# Patient Record
Sex: MALE | Race: WHITE | Employment: FULL TIME | URBAN - METROPOLITAN AREA
[De-identification: names, ages, dates, MRNs, and addresses within clinical notes are randomized per-mention and may not be internally consistent; named-entity substitution may affect disease eponyms.]

---

## 2018-09-18 ENCOUNTER — HOSPITAL ENCOUNTER (OUTPATIENT)
Dept: MRI IMAGING | Age: 23
Discharge: HOME | End: 2018-09-18
Attending: PODIATRIST
Payer: COMMERCIAL

## 2018-09-18 DIAGNOSIS — T14.8XXA FRACTURE: ICD-10-CM

## 2018-09-18 PROCEDURE — 73720 MRI LWR EXTREMITY W/O&W/DYE: CPT

## 2018-09-18 PROCEDURE — 74011250636 HC RX REV CODE- 250/636: Performed by: PODIATRIST

## 2018-09-18 PROCEDURE — A9575 INJ GADOTERATE MEGLUMI 0.1ML: HCPCS | Performed by: PODIATRIST

## 2018-09-18 RX ORDER — GADOTERATE MEGLUMINE 376.9 MG/ML
15 INJECTION INTRAVENOUS
Status: COMPLETED | OUTPATIENT
Start: 2018-09-18 | End: 2018-09-18

## 2018-09-18 RX ADMIN — GADOTERATE MEGLUMINE 15 ML: 376.9 INJECTION INTRAVENOUS at 15:19

## 2018-11-07 ENCOUNTER — OFFICE VISIT (OUTPATIENT)
Dept: INTERNAL MEDICINE CLINIC | Facility: CLINIC | Age: 23
End: 2018-11-07

## 2018-11-07 VITALS
HEART RATE: 73 BPM | DIASTOLIC BLOOD PRESSURE: 72 MMHG | RESPIRATION RATE: 18 BRPM | BODY MASS INDEX: 21.53 KG/M2 | TEMPERATURE: 98 F | HEIGHT: 70 IN | WEIGHT: 150.4 LBS | SYSTOLIC BLOOD PRESSURE: 114 MMHG

## 2018-11-07 DIAGNOSIS — Z23 ENCOUNTER FOR IMMUNIZATION: Primary | ICD-10-CM

## 2018-11-07 DIAGNOSIS — J45.20 MILD INTERMITTENT ASTHMA, UNSPECIFIED WHETHER COMPLICATED: ICD-10-CM

## 2018-11-07 DIAGNOSIS — K21.00 GASTROESOPHAGEAL REFLUX DISEASE WITH ESOPHAGITIS: ICD-10-CM

## 2018-11-07 RX ORDER — ALBUTEROL SULFATE 90 UG/1
AEROSOL, METERED RESPIRATORY (INHALATION)
COMMUNITY
End: 2018-11-07 | Stop reason: SDUPTHER

## 2018-11-07 RX ORDER — LAMOTRIGINE 200 MG/1
50 TABLET ORAL DAILY
COMMUNITY

## 2018-11-07 RX ORDER — BUSPIRONE HYDROCHLORIDE 15 MG/1
15 TABLET ORAL
COMMUNITY
End: 2021-12-13 | Stop reason: ALTCHOICE

## 2018-11-07 RX ORDER — FLUOXETINE HYDROCHLORIDE 20 MG/1
CAPSULE ORAL DAILY
COMMUNITY
End: 2019-03-05 | Stop reason: ALTCHOICE

## 2018-11-07 RX ORDER — GABAPENTIN 400 MG/1
400 CAPSULE ORAL
COMMUNITY
End: 2019-03-05

## 2018-11-07 RX ORDER — ALBUTEROL SULFATE 90 UG/1
2 AEROSOL, METERED RESPIRATORY (INHALATION)
Qty: 1 INHALER | Refills: 5 | Status: SHIPPED | OUTPATIENT
Start: 2018-11-07 | End: 2021-04-30 | Stop reason: SDUPTHER

## 2018-11-07 RX ORDER — PHENOL/SODIUM PHENOLATE
AEROSOL, SPRAY (ML) MUCOUS MEMBRANE
Qty: 30 TAB | Refills: 2 | Status: SHIPPED | OUTPATIENT
Start: 2018-11-07 | End: 2019-03-23 | Stop reason: SDUPTHER

## 2018-11-07 NOTE — PATIENT INSTRUCTIONS

## 2018-11-07 NOTE — PROGRESS NOTES
Subjective:      Clay Prajapati is a 21 y.o. male who is a new patient and is here to establish care and discuss reflux. He is from [facility]. The following sections were reviewed & updated as appropriate: PMH, PL, PSH, FH, RxH, and SH. GI Review  He has a history of GERD since he was 15, he has had symptoms the last 2 weeks and states nothing helping. He has tried tums, pepto. He is trying to avoid acidic foods/greasey foods, this was minimally helpful. Apply cider vinegar helped. He has had 2 endoscopies in the past and they found esophagitis. Current symptoms include: he notes throat tightness without the sensation of dysphagia. There are no active problems to display for this patient. Current Outpatient Medications   Medication Sig Dispense Refill    lamoTRIgine (LAMICTAL) 200 mg tablet Take  by mouth daily.  FLUoxetine (PROZAC) 20 mg capsule Take  by mouth daily.  gabapentin (NEURONTIN) 400 mg capsule Take 400 mg by mouth nightly.  busPIRone (BUSPAR) 15 mg tablet Take 15 mg by mouth three (3) times daily.  albuterol (PROVENTIL HFA, VENTOLIN HFA, PROAIR HFA) 90 mcg/actuation inhaler Take 2 Puffs by inhalation every six (6) hours as needed for Wheezing. 1 Inhaler 5    Omeprazole delayed release (PRILOSEC D/R) 20 mg tablet Use daily for 3-5 days per flare 30 Tab 2     No Known Allergies  Past Medical History:   Diagnosis Date    Asthma     Borderline personality disorder ([facility] [address]     Concussion     Migraines      Past Surgical History:   Procedure Laterality Date    HX ORTHOPAEDIC      right shoulder and both hips        Review of Systems    A comprehensive review of systems was negative except for that written in the HPI.      Objective:     Visit Vitals  /72   Pulse 73   Temp 98 °F (36.7 °C) (Oral)   Resp 18   Ht 5' 10\" (1.778 m)   Wt 150 lb 6.4 oz (68.2 kg)   BMI 21.58 kg/m²     General appearance: alert, cooperative, no distress, appears stated age  Head: Normocephalic, without obvious abnormality, atraumatic  Eyes: negative  Throat: Lips, mucosa, and tongue normal. Teeth and gums normal  Lungs: clear to auscultation bilaterally  Chest wall: no tenderness  Heart: regular rate and rhythm, S1, S2 normal, no murmur, click, rub or gallop  Extremities: extremities normal, atraumatic, no cyanosis or edema  Neurologic: Grossly normal  Nursing note and vitals reviewed  Assessment/Plan:       ICD-10-CM ICD-9-CM    1. Encounter for immunization Z23 V03.89 INFLUENZA VIRUS VAC QUAD,SPLIT,PRESV FREE SYRINGE IM   2. Mild intermittent asthma, unspecified whether complicated T07.94 519.12 albuterol (PROVENTIL HFA, VENTOLIN HFA, PROAIR HFA) 90 mcg/actuation inhaler   3. Gastroesophageal reflux disease with esophagitis K21.0 530.11 Omeprazole delayed release (PRILOSEC D/R) 20 mg tablet       Follow-up Disposition:  Return if symptoms worsen or fail to improve. Advised him to call back or return to office if symptoms worsen/change/persist.  Discussed expected course/resolution/complications of diagnosis in detail with patient. Medication risks/benefits/costs/interactions/alternatives discussed with patient. He was given an after visit summary which includes diagnoses, current medications, & vitals. He expressed understanding with the diagnosis and plan.

## 2018-11-07 NOTE — PROGRESS NOTES
Chief Complaint   Patient presents with    Heartburn     acid reflux for the last 2 weeks nothing helping. Tried to avoid acidic foods/greasey foods. 1. Have you been to the ER, urgent care clinic since your last visit? Hospitalized since your last visit? No    2. Have you seen or consulted any other health care providers outside of the [address] [place] [address] since your last visit? Include any pap smears or colon screening. Lissy Campos  is a 21 y.o.  male  who present for influenza immunizations/injections. He/she denies any symptoms , reactions or allergies that would exclude them from being immunized today. Risks and adverse reactions were discussed and the VIS was given if applicable to them. All questions were addressed. He/She was observed for 5 min post injection. There were no reactions observed.     Susan Farr LPN

## 2018-12-31 ENCOUNTER — OFFICE VISIT (OUTPATIENT)
Dept: INTERNAL MEDICINE CLINIC | Facility: CLINIC | Age: 23
End: 2018-12-31

## 2018-12-31 VITALS
BODY MASS INDEX: 21.62 KG/M2 | HEART RATE: 75 BPM | WEIGHT: 151 LBS | SYSTOLIC BLOOD PRESSURE: 111 MMHG | RESPIRATION RATE: 16 BRPM | TEMPERATURE: 97.8 F | DIASTOLIC BLOOD PRESSURE: 71 MMHG | HEIGHT: 70 IN

## 2018-12-31 DIAGNOSIS — M25.511 ACUTE PAIN OF RIGHT SHOULDER: Primary | ICD-10-CM

## 2018-12-31 RX ORDER — DICLOFENAC POTASSIUM 50 MG/1
50 TABLET, FILM COATED ORAL 3 TIMES DAILY
COMMUNITY
End: 2019-03-05

## 2018-12-31 RX ORDER — CYCLOBENZAPRINE HCL 10 MG
TABLET ORAL
COMMUNITY
End: 2019-02-06 | Stop reason: SDUPTHER

## 2018-12-31 NOTE — PROGRESS NOTES
Chief Complaint   Patient presents with    Shoulder Pain     right shoulder pain for the last 3 weeks has gotten progressivly worse. Same shoulder he previosly had surgery on.      1. Have you been to the ER, urgent care clinic since your last visit? Hospitalized since your last visit? Yes When: 122/29/18 Where: Urgent Care Reason for visit: Right shoulder pain    2. Have you seen or consulted any other health care providers outside of the [address] [place] [address] since your last visit? Include any pap smears or colon screening.  No

## 2018-12-31 NOTE — PROGRESS NOTES
Subjective:      Kristin Corona is a 21 y.o. male who presents today for shoulder pain. Shoulder pain:right shoulder pain for the last 3 weeks, has gotten progressivly worse. Same shoulder he previosly had surgery on.  symptoms include dull and achy constantly with intermittent sharp pain. He was seen at urgent care and started on diclofenac and flexeril. Symptoms started with throwing snowballs. He denies numbness and tingling. There are no active problems to display for this patient. Current Outpatient Medications   Medication Sig Dispense Refill    cyclobenzaprine (FLEXERIL) 10 mg tablet Take  by mouth three (3) times daily as needed for Muscle Spasm(s).  diclofenac potassium (CATAFLAM) 50 mg tablet Take 50 mg by mouth three (3) times daily.  lamoTRIgine (LAMICTAL) 200 mg tablet Take  by mouth daily.  FLUoxetine (PROZAC) 20 mg capsule Take  by mouth daily.  gabapentin (NEURONTIN) 400 mg capsule Take 400 mg by mouth nightly.  busPIRone (BUSPAR) 15 mg tablet Take 15 mg by mouth three (3) times daily.  albuterol (PROVENTIL HFA, VENTOLIN HFA, PROAIR HFA) 90 mcg/actuation inhaler Take 2 Puffs by inhalation every six (6) hours as needed for Wheezing. 1 Inhaler 5    Omeprazole delayed release (PRILOSEC D/R) 20 mg tablet Use daily for 3-5 days per flare 30 Tab 2     No Known Allergies  Past Medical History:   Diagnosis Date    Asthma     Borderline [facility] disorder ([facility] [address]     Concussion     Migraines      Past Surgical History:   Procedure Laterality Date    HX ORTHOPAEDIC      right shoulder and both hips        Review of Systems    A comprehensive review of systems was negative except for that written in the HPI.      Objective:     Visit Vitals  /71   Pulse 75   Temp 97.8 °F (36.6 °C) (Oral)   Resp 16   Ht 5' 10\" (1.778 m)   Wt 151 lb (68.5 kg)   BMI 21.67 kg/m²     General appearance: alert, cooperative, no distress, appears stated age  Head: Normocephalic, without obvious abnormality, atraumatic  Eyes: negative  Lungs: clear to auscultation bilaterally  Heart: regular rate and rhythm, S1, S2 normal, no murmur, click, rub or gallop  Extremities: extremities normal, atraumatic, no cyanosis or edema. Pain with right shoulder rotation. Asymmetrical shoulders  Pulses: 2+ and symmetric  Neurologic: Grossly normal  Nursing note and vitals reviewed  Assessment/Plan:       ICD-10-CM ICD-9-CM    1. Acute pain of right shoulder M25.511 719.41 REFERRAL TO SPORTS MEDICINE      REFERRAL TO ORTHOPEDIC SURGERY       Follow-up Disposition:  Return if symptoms worsen or fail to improve. Advised him to call back or return to office if symptoms worsen/change/persist.  Discussed expected course/resolution/complications of diagnosis in detail with patient. Medication risks/benefits/costs/interactions/alternatives discussed with patient. He was given an after visit summary which includes diagnoses, current medications, & vitals. He expressed understanding with the diagnosis and plan.

## 2019-01-03 ENCOUNTER — OFFICE VISIT (OUTPATIENT)
Dept: INTERNAL MEDICINE CLINIC | Age: 24
End: 2019-01-03

## 2019-01-03 VITALS
OXYGEN SATURATION: 99 % | HEIGHT: 70 IN | SYSTOLIC BLOOD PRESSURE: 116 MMHG | RESPIRATION RATE: 16 BRPM | TEMPERATURE: 98 F | HEART RATE: 63 BPM | BODY MASS INDEX: 21.43 KG/M2 | WEIGHT: 149.7 LBS | DIASTOLIC BLOOD PRESSURE: 74 MMHG

## 2019-01-03 DIAGNOSIS — M25.511 ACUTE PAIN OF RIGHT SHOULDER: ICD-10-CM

## 2019-01-03 DIAGNOSIS — S43.431A TEAR OF RIGHT GLENOID LABRUM, INITIAL ENCOUNTER: Primary | ICD-10-CM

## 2019-01-03 RX ORDER — METHYLPREDNISOLONE 4 MG/1
TABLET ORAL
Qty: 1 DOSE PACK | Refills: 0 | Status: SHIPPED | OUTPATIENT
Start: 2019-01-03 | End: 2019-03-05 | Stop reason: ALTCHOICE

## 2019-01-03 NOTE — PROGRESS NOTES
Chief Complaint   Patient presents with    Shoulder Pain     he is a 21y.o. year old male who presents for evaluation of right shoulder  Pain Assessment Encounter      Arabella Rodriguez  1/3/2019  Onset of Symptoms: a couple weeks  ________________________________________________________________________  Description: Patient has a hx of torn labrum and had it repaired. Patient was throwing snowballs and noticed his shoulder was painful. Frequency: more than 5 times a day  Pain Scale:(1-10): 5  Trauma Hx: throwing snow balls  Hx of similar symptoms: Yes  Radiation: YES, arm  Duration:  continuous      Progression: has worsened  What makes it better?: ice, OTC meds and rest  What makes it worse?:use  Medications tried: acetaminophen, ibuprofen    Reviewed and agree with Nurse Note and duplicated in this note. Reviewed PmHx, RxHx, FmHx, SocHx, AllgHx and updated and dated in the chart.     Family History   Problem Relation Age of Onset    Asthma Mother     Asthma Father        Past Medical History:   Diagnosis Date    Asthma     Borderline personality disorder ([facility] [address]     Concussion     Migraines       Social History     Socioeconomic History    Marital status: SINGLE     Spouse name: Not on file    Number of children: Not on file    Years of education: Not on file    Highest education level: Not on file   Tobacco Use    Smoking status: Never Smoker    Smokeless tobacco: Never Used   Substance and Sexual Activity    Alcohol use: No     Frequency: Never        Review of Systems - negative except as listed above      Objective:     Vitals:    01/03/19 1430   BP: 116/74   Pulse: 63   Resp: 16   Temp: 98 °F (36.7 °C)   TempSrc: Oral   SpO2: 99%   Weight: 149 lb 11.2 oz (67.9 kg)   Height: 5' 10\" (1.778 m)       Physical Examination: General appearance - alert, well appearing, and in no distress  Back exam - full range of motion, no tenderness, palpable spasm or pain on motion  Neurological - alert, oriented, normal speech, no focal findings or movement disorder noted  Musculoskeletal - The right shoulder is  normal to inspection. The patient has diminished range with pain  . The shoulderis tender to palpation . Bicep tendon: non-tender  The NEER test is positive. The Gilmore test:is positive   The Cross over test:is  negative. The Empty Can test:is  positive. Stressed ext rotation is:  negative. Stressed int rotation: negative. The Apprehension Sign is negative. The Lift off test is:  negative   Examination reveals the Painful Arch:  positive. The Labral Test is:  Positive. The Sulcus Sign is:  negative. Extremities - peripheral pulses normal, no pedal edema, no clubbing or cyanosis  Skin - normal coloration and turgor, no rashes, no suspicious skin lesions noted    Assessment/ Plan:   Diagnoses and all orders for this visit:    1. Tear of right glenoid labrum, initial encounter  -     XR SHOULDER RT AP/LAT MIN 2 V; Future    Other orders  -     methylPREDNISolone (MEDROL DOSEPACK) 4 mg tablet; Take as directed    Physical findings of labral tear, will attempt conservative management with prednisone and physical therapy. If no resolution will get MRI    Pathophysiology, recovery and rehabilitation process discussed and questions answered   Counseling for 30 Minutes of the total visit duration   Pictures and figures used as necessary   Provided reassurance   Monitor response to injection   Discussed steroid side effects of fat atrophy, hypopigmentation, steroid flare or infection   Monitor response to Physical Therapy   Recommend activity modification   Recommend  lower impact activities-walking, Eliptical, Nordic Track, cycling or swimming      Patient was informed/counseled on: Body mass index is 21.48 kg/m². 1) Remember to stay active and/or exercise regularly (I suggest 30-45 minutes daily)   2) For reliable dietary information, go to www. EATRIGHT.org. You may wish to consider seeing the nutritionist at [facility] [phone], also consider the [address] I have discussed the diagnosis with the patient and the intended plan as seen in the above orders. The patient has received an after-visit summary and questions were answered concerning future plans. Medication Side Effects and Warnings were discussed with patient,  Patient Labs were reviewed and or requested, and  Patient Past Records were reviewed and or requested  yes      Pt agrees to call or return to clinic and/or go to closest ER with any worsening of symptoms. This may include, but not limited to increased fever (>100.4) with NSAIDS or Tylenol, increased edema, confusion, rash, worsening of presenting symptoms.

## 2019-01-14 ENCOUNTER — HOSPITAL ENCOUNTER (OUTPATIENT)
Dept: PHYSICAL THERAPY | Age: 24
Discharge: HOME | End: 2019-01-14
Payer: COMMERCIAL

## 2019-01-14 PROCEDURE — 97161 PT EVAL LOW COMPLEX 20 MIN: CPT

## 2019-01-14 PROCEDURE — 97016 VASOPNEUMATIC DEVICE THERAPY: CPT

## 2019-01-14 PROCEDURE — 97110 THERAPEUTIC EXERCISES: CPT

## 2019-01-14 NOTE — PROGRESS NOTES
[facility]  [address] [address], [address] [place] [address]  Phone: [phone]  Fax: [phone]    Plan of Care/Statement of Necessity for Physical Therapy Services  2-15    Patient name: Gerhardt Slimmer  : 1995  Provider#: [id number]  Referral source: Baldev Tatum MD      Medical/Treatment Diagnosis: Tear of right acetabular labrum [S73.191A]     Prior Hospitalization: see medical history     Comorbidities: right SLAP repair   Prior Level of Function: pt works full-time as a cook/baker  Medications: Verified on Patient Summary List    Start of Care: 2019      Onset Date: 2018       The Plan of Care and following information is based on the information from the initial evaluation. Assessment/ key information: Pt is a 21year old male who is referred to Physical Therapy by Dr. Adilia Traylor with a diagnosis of tear of right glenoid labrum. Pt has history of right SLAP repair in 2013. Pain started in 2018, after throwing snowballs. Strength in right UE is intact. Right shoulder A/PROM is limited and painful. Patient will benefit from skilled PT services to modify and progress therapeutic interventions, address functional mobility deficits, address ROM deficits, address strength deficits, analyze and address soft tissue restrictions, analyze and cue movement patterns, analyze and modify body mechanics/ergonomics and assess and modify postural abnormalities to attain pt/PT goals. Evaluation Complexity History MEDIUM  Complexity : 1-2 comorbidities / personal factors will impact the outcome/ POC ; Examination HIGH Complexity : 4+ Standardized tests and measures addressing body structure, function, activity limitation and / or participation in recreation  ;Presentation LOW Complexity : Stable, uncomplicated  ;Clinical Decision Making MEDIUM Complexity;  Overall Complexity Rating: LOW     Problem List: pain affecting function, decrease ROM, decrease strength, decrease ADL/ functional abilitiies, decrease activity tolerance, decrease flexibility/ joint mobility and decrease transfer abilities   Treatment Plan may include any combination of the following: Therapeutic exercise, Therapeutic activities, Neuromuscular re-education, Physical agent/modality, Manual therapy and Patient education  Patient / Family readiness to learn indicated by: asking questions, trying to perform skills and interest  Persons(s) to be included in education: patient (P)  Barriers to Learning/Limitations: None  Patient Goal (s): Pain reduction, increased strength and flexibility.   Patient Self Reported Health Status: fair  Rehabilitation Potential: good    Short Term Goals: To be accomplished in 2 weeks:  1)  Pt will be Independent with HEP. 2) Pt will be able to reach above shoulder level without increase of pain. 3) Pt will be able to wash face without pain. Long Term Goals: To be accomplished in 4 weeks:  1) Pt will be able to tuck in the shirt without increase of pain. 2) Pt will be able to reach above shoulder level without increase of pain. 3) Pt will be able to lift >/= 25 pounds without pain. 4) Pt will be able to sleep on involved side without waking due to pain. Frequency / Duration: Patient to be seen 2 times per week for 4 weeks. Patient/ Caregiver education and instruction: self care, activity modification and exercises     [x]  Plan of care has been reviewed with WHITNEY Krause PT, DPT   1/14/2019 10:51 AM    ________________________________________________________________________    I certify that the above Therapy Services are being furnished while the patient is under my care. I agree with the treatment plan and certify that this therapy is necessary.     [de-identified] Signature:____________________  Date:____________Time: _________

## 2019-01-14 NOTE — PROGRESS NOTES
PT INITIAL EVALUATION NOTE - [facility] 2-15    Patient Name: Adell Sandhoff  Date:2019  : 1995  [x]  Patient  Verified  Payor: Irene Benavides / Plan: Gela Greer Se HMO / Product Type: HMO /    In time:9:45am  Out time:10:40am  Total Treatment Time (min): 55  Total Timed Codes (min): 10  1:1 Treatment Time ( [address] only): --   Visit #: 1     Treatment Area: Tear of right acetabular labrum [S73.191A]    SUBJECTIVE  Pain Level (0-10 scale): 5/10  Any medication changes, allergies to medications, adverse drug reactions, diagnosis change, or new procedure performed?: [] No    [x] Yes (see summary sheet for update)  Subjective:    Pt reports pain in right shoulder that started about 1 month ago (2018) after throwing snowballs. No pain initially- woke up with pain in right anterior shoulder the next day. Pt has history of right SLAP tear- surgically repaired in  and is worried that he re-tore his labrum. Pt is not able to sleep on right side. He has modified activities at work due to pain (pt works as a iXpert and baker). Pt denies radicular symptoms in right UE. PLOF: no right shoulder pain or functional limitation  Mechanism of Injury: overuse/throwing  Previous Treatment/Compliance: NSAID; however, pt has tried to use them minimally due to stomach issues/IBS; prednisone dose pack, ice, rest, activity modification  PMHx/Surgical Hx: right SLAP repair 2013  Work Hx: pt works full-time as a iXpert/baker  Living Situation: pt lives alone  Pt Goals: \"Pain reduction, increased strength and flexibility. \"  Barriers: history of previous SLAP tear  Motivation: high  Substance use: none  FABQ Score: not captured     OBJECTIVE/EXAMINATION  Posture:  Slouched posture with rounded shoulders and forward head  Other Observations:  Increased turgor in right UT   Functional  and Pinch:  unremarkable  Palpation: tenderness to palpation right subacromial space, right UT, pecs    Right Shoulder ROM: AROM     PROM   Flexion   130 degrees, pain   160 degrees   Abduction  90 degrees, pain   120 degrees   IR   Hand to T1, pain   45 degrees   ER   Hand to T11, pain   50 degrees    Joint Mobility Assessment: Glenohumeral: muscle guarding, pain        Flexibility: tightness right pecs, UTs, biceps    UPPER QUARTER   MUSCLE STRENGTH  KEY       R  L  0 - No Contraction   Flexion  4/5, pain --  1 - Trace    Extension 5/5  --  2 - Poor    Abduction 4+/5, pain --  3 - Fair     IR  5/5, pain --  4 - Good    ER  4+/5, pain --  5 - Normal       Neurological: Reflexes / Sensations: normal  Special Tests: Briseida Cove: negative      Scapular Reposition: positive  Shoulder Abduction: positive     Crank: positive    Rice: positive    Relocation : positive   Speed's: positive    Yergason: positive    Modality rationale: decrease inflammation and decrease pain to improve the patients ability to use right UE to perform functional activities and work duties   Type Additional Details   [] Estim: []Att   []Unatt        []TENS instruct                  []IFC  []Premod   []NMES                     []Other:  []w/US   []w/ice   []w/heat  Position:  Location:   []  Traction: [] Cervical       []Lumbar                       [] Prone          []Supine                       []Intermittent   []Continuous Lbs:  [] before manual  [] after manual  []w/heat   []  Ultrasound: []Continuous   [] Pulsed at:                           []1MHz   []3MHz Location:  W/cm2:   [] Paraffin         Location:   []w/heat   []  Ice     []  Heat  []  Ice massage Position:  Location:   []  Laser  []  Other: Position:  Location:     [x]  Vasopneumatic Device Pressure:       [] lo [x] med [] hi   Temperature: 34 degrees  Location: right shoulder with pt supine at end of session     [x] Skin assessment post-treatment:  [x]intact []redness- no adverse reaction    []redness - adverse reaction:     10 min Therapeutic Exercise:  [] See flow sheet :   Rationale: increase ROM and increase strength to improve the patients ability to use right UE for functional and recreational activities          With   [] TE   [] TA   [] neuro   [] other: Patient Education: [x] Review HEP- pt given handout with exercises for HEP    [] Progressed/Changed HEP based on:   [] positioning   [] body mechanics   [] transfers   [] heat/ice application    [] other:      Other Objective/Functional Measures: FOTO: not captured    Pain Level (0-10 scale) post treatment: 1/10    ASSESSMENT/Changes in Function:   Pt is a 21year old male who is referred to Physical Therapy by Dr. Hari Ray with a diagnosis of tear of right glenoid labrum. Pt has history of right SLAP repair in April 2013. Pain started in December 2018, after throwing snowballs. Strength in right UE is intact. Right shoulder A/PROM is limited and painful. Patient will benefit from skilled PT services to modify and progress therapeutic interventions, address functional mobility deficits, address ROM deficits, address strength deficits, analyze and address soft tissue restrictions, analyze and cue movement patterns, analyze and modify body mechanics/ergonomics and assess and modify postural abnormalities to attain pt/PT goals.      [x]  See Plan of [address] PT, DPT  1/14/2019  10:49 AM

## 2019-01-17 ENCOUNTER — HOSPITAL ENCOUNTER (OUTPATIENT)
Dept: PHYSICAL THERAPY | Age: 24
Discharge: HOME | End: 2019-01-17
Payer: COMMERCIAL

## 2019-01-17 PROCEDURE — 97140 MANUAL THERAPY 1/> REGIONS: CPT

## 2019-01-17 PROCEDURE — 97016 VASOPNEUMATIC DEVICE THERAPY: CPT

## 2019-01-17 PROCEDURE — 97110 THERAPEUTIC EXERCISES: CPT

## 2019-01-17 NOTE — PROGRESS NOTES
PT DAILY TREATMENT NOTE - [facility] 2-15    Patient Name: Odalis Iglesias  Date:2019  : 1995  [x]  Patient  Verified  Payor: [place] Fajardo / Plan: [address] HMO / Product Type: HMO /    In time: 9:05am  Out time:10:15am  Total Treatment Time (min): 70  Total Timed Codes (min): 55  1:1 Treatment Time ( [address] only): --   Visit #: 2     Treatment Area: Right shoulder pain [M25.511]    SUBJECTIVE  Pain Level (0-10 scale): 4/10  Any medication changes, allergies to medications, adverse drug reactions, diagnosis change, or new procedure performed?: [x] No    [] Yes (see summary sheet for update)  Subjective functional status/changes:   [] No changes reported  Pt reports compliance with HEP. Pt states that right shoulder feel \"sore\" after performing exercises at home and after work- doing a lot of lifting and stirring.       OBJECTIVE  Modality rationale: decrease inflammation and decrease pain to improve the patients ability to use right UE to perform functional activities and work duties   Min Type Additional Details      [] Estim: []Att   []Unatt    []TENS instruct                  []IFC  []Premod   []NMES                     []Other:  []w/US   []w/ice   []w/heat  Position:  Location:      []  Traction: [] Cervical       []Lumbar                       [] Prone          []Supine                       []Intermittent   []Continuous Lbs:  [] before manual  [] after manual  []w/heat    []  Ultrasound: []Continuous   [] Pulsed                       at: []1MHz   []3MHz Location:  W/cm2:    [] Paraffin         Location:   []w/heat    []  Ice     []  Heat  []  Ice massage Position:  Location:    []  Laser  []  Other: Position:  Location:   15   [x]  Vasopneumatic Device Pressure:       [] lo [x] med [] hi   Temperature: 34 degree   [x] Skin assessment post-treatment:  [x]intact []redness- no adverse reaction    []redness - adverse reaction:     45 min Therapeutic Exercise:  [x] See flow sheet :   Rationale: increase ROM, increase strength and improve coordination to improve the patients ability to use right UE to perform functional activities with increased ease    10 min Manual Therapy: right scapular mobilization inferior glide and distraction, grade II and III; PROM right shoulder flexion, abduction, ER to pt's tolerance; right scapula PNF D2 protraction/retraction with light manual resistance   Rationale: decrease pain, increase ROM, increase tissue extensibility and increase postural awareness to improve the patients ability to use right UE to perform functional activities and work duties    With   [] TE   [] TA   [] neuro   [] other: Patient Education: [x] Review HEP    [] Progressed/Changed HEP based on:   [] positioning   [] body mechanics   [] transfers   [] heat/ice application    [] other:      Other Objective/Functional Measures: --     Pain Level (0-10 scale) post treatment: 0/10    ASSESSMENT/Changes in Function:   Pt complained of \"pinching\" pain with wall slide flexion (with ball); therefore, modified flexion stretch to table walk-out, which pt was pain-free. Pt fatigued quickly with strengthening exercises. Patient will continue to benefit from skilled PT services to modify and progress therapeutic interventions, address functional mobility deficits, address ROM deficits, address strength deficits, analyze and address soft tissue restrictions, analyze and cue movement patterns, analyze and modify body mechanics/ergonomics and assess and modify postural abnormalities to attain remaining goals. []  See Plan of Care  []  See progress note/recertification  []  See Discharge Summary         Progress towards goals / Updated goals:  Short Term Goals: To be accomplished in 2 weeks:  1)  Pt will be Independent with HEP. 2) Pt will be able to reach above shoulder level without increase of pain. 3) Pt will be able to wash face without pain.     Long Term Goals:  To be accomplished in 4 weeks:  1) Pt will be able to tuck in the shirt without increase of pain. 2) Pt will be able to reach above shoulder level without increase of pain. 3) Pt will be able to lift >/= 25 pounds without pain. 4) Pt will be able to sleep on involved side without waking due to pain.                        PLAN  [x]  Upgrade activities as tolerated     [x]  Continue plan of care  []  Update interventions per flow sheet       []  Discharge due to:_  []  Other:_      Perico Graham PT, DPT   1/17/2019  9:22 AM

## 2019-01-21 ENCOUNTER — HOSPITAL ENCOUNTER (OUTPATIENT)
Dept: PHYSICAL THERAPY | Age: 24
Discharge: HOME | End: 2019-01-21
Payer: COMMERCIAL

## 2019-01-21 PROCEDURE — 97110 THERAPEUTIC EXERCISES: CPT

## 2019-01-21 PROCEDURE — 97016 VASOPNEUMATIC DEVICE THERAPY: CPT

## 2019-01-21 PROCEDURE — 97140 MANUAL THERAPY 1/> REGIONS: CPT

## 2019-01-21 NOTE — PROGRESS NOTES
PT DAILY TREATMENT NOTE - [facility] 2-15    Patient Name: Bebeto Rodríguez  Date:2019  : 1995  [x]  Patient  Verified  Payor: Clarisa Chau / Plan: [address] HMO / Product Type: HMO /    In time: 3:40pm  Out time: 4:48pm  Total Treatment Time (min): 78  Total Timed Codes (min): 63  1:1 Treatment Time ( only): --   Visit #: 3    Treatment Area: Right shoulder pain [M25.511]    SUBJECTIVE  Pain Level (0-10 scale): 3/10  Any medication changes, allergies to medications, adverse drug reactions, diagnosis change, or new procedure performed?: [x] No    [] Yes (see summary sheet for update)  Subjective functional status/changes:   [] No changes reported  Pt states that he has not done much to aggravate right shoulder since last PT session. Pt complains that he is still having pain at night. Pt complains of \"clunking\" in right shoulder with rotational movements, which he did not have prior to the recent injury.     OBJECTIVE  Modality rationale: decrease inflammation and decrease pain to improve the patients ability to use right UE to perform functional activities and work duties   Min Type Additional Details      [] Estim: []Att   []Unatt    []TENS instruct                  []IFC  []Premod   []NMES                     []Other:  []w/US   []w/ice   []w/heat  Position:  Location:      []  Traction: [] Cervical       []Lumbar                       [] Prone          []Supine                       []Intermittent   []Continuous Lbs:  [] before manual  [] after manual  []w/heat    []  Ultrasound: []Continuous   [] Pulsed                       at: []1MHz   []3MHz Location:  W/cm2:    [] Paraffin         Location:   []w/heat    []  Ice     []  Heat  []  Ice massage Position:  Location:    []  Laser  []  Other: Position:  Location:   15   [x]  Vasopneumatic Device Pressure:       [] lo [x] med [] hi   Temperature: 34 degree   [x] Skin assessment post-treatment:  [x]intact []redness- no adverse reaction    []redness - adverse reaction:     53 min Therapeutic Exercise:  [x] See flow sheet :   Rationale: increase ROM, increase strength and improve coordination to improve the patients ability to use right UE to perform functional activities with increased ease    10 min Manual Therapy: PROM right shoulder flexion, abduction, ER to pt's tolerance; passive stretch to right biceps; right scapula PNF D2 protraction/retraction with light manual resistance; P/A glides to mid-upper thoracic spine, grade II and III   Rationale: decrease pain, increase ROM, increase tissue extensibility and increase postural awareness to improve the patients ability to use right UE to perform functional activities and work duties    With   [] TE   [] TA   [] neuro   [] other: Patient Education: [x] Review HEP    [] Progressed/Changed HEP based on:   [] positioning   [] body mechanics   [] transfers   [] heat/ice application    [] other:      Other Objective/Functional Measures: --     Pain Level (0-10 scale) post treatment: 0/10    ASSESSMENT/Changes in Function:   Pt reports apprehension with manual ER. Recommend pt continue outpatient PT but follow-up with Dr. Tania Sandhu concerning persistent night pain and clunking in right shoulder. Patient will continue to benefit from skilled PT services to modify and progress therapeutic interventions, address functional mobility deficits, address ROM deficits, address strength deficits, analyze and address soft tissue restrictions, analyze and cue movement patterns, analyze and modify body mechanics/ergonomics and assess and modify postural abnormalities to attain remaining goals. []  See Plan of Care  []  See progress note/recertification  []  See Discharge Summary         Progress towards goals / Updated goals:  Short Term Goals: To be accomplished in 2 weeks:  1)  Pt will be Independent with HEP. MET  2) Pt will be able to reach above shoulder level without increase of pain.   3) Pt will be able to wash face without pain.     Long Term Goals: To be accomplished in 4 weeks:  1) Pt will be able to tuck in the shirt without increase of pain. 2) Pt will be able to reach above shoulder level without increase of pain. 3) Pt will be able to lift >/= 25 pounds without pain. 4) Pt will be able to sleep on involved side without waking due to pain.                        PLAN  [x]  Upgrade activities as tolerated     [x]  Continue plan of care  []  Update interventions per flow sheet       []  Discharge due to:_  []  Other:_      Lidia Arora PT, DPT   1/21/2019  9:22 AM

## 2019-01-24 ENCOUNTER — APPOINTMENT (OUTPATIENT)
Dept: PHYSICAL THERAPY | Age: 24
End: 2019-01-24
Payer: COMMERCIAL

## 2019-01-28 ENCOUNTER — OFFICE VISIT (OUTPATIENT)
Dept: INTERNAL MEDICINE CLINIC | Age: 24
End: 2019-01-28

## 2019-01-28 VITALS
DIASTOLIC BLOOD PRESSURE: 71 MMHG | WEIGHT: 148 LBS | OXYGEN SATURATION: 97 % | BODY MASS INDEX: 21.19 KG/M2 | HEIGHT: 70 IN | HEART RATE: 69 BPM | SYSTOLIC BLOOD PRESSURE: 127 MMHG

## 2019-01-28 DIAGNOSIS — M25.511 ACUTE PAIN OF RIGHT SHOULDER: Primary | ICD-10-CM

## 2019-01-28 DIAGNOSIS — S43.431A TEAR OF RIGHT GLENOID LABRUM, INITIAL ENCOUNTER: ICD-10-CM

## 2019-01-28 NOTE — PROGRESS NOTES
Chief Complaint   Patient presents with    Shoulder Pain     follow up right     he is a 21y.o. year old male who presents for follow up of injury. Follow Up Pain Assessment Encounter      Onset of Symptoms: 2 months  ________________________________________________________________________  Description: Pain is now has significantly improved      Pain Scale:(1-10): 4 currently and 8/10 at its worse  Duration:  continuous  Radiation: none, sometimes pain radiates down right arm and neck tightness. What makes it better?: ice  What makes it worse?:abducting right arm  Medications tried: ibuprofen  Modalities tried: PT        Reviewed and agree with Nurse Note and duplicated in this note. Reviewed PmHx, RxHx, FmHx, SocHx, AllgHx and updated and dated in the chart. Family History   Problem Relation Age of Onset    Asthma Mother     Asthma Father        Past Medical History:   Diagnosis Date    Asthma     Borderline personality disorder ([facility] [address]     Concussion     Migraines       Social History     Socioeconomic History    Marital status: SINGLE     Spouse name: Not on file    Number of children: Not on file    Years of education: Not on file    Highest education level: Not on file   Tobacco Use    Smoking status: Never Smoker    Smokeless tobacco: Never Used   Substance and Sexual Activity    Alcohol use: No     Frequency: Never        Review of Systems - negative except as listed above      Objective:     Vitals:    01/28/19 1552   Weight: 67.1 kg (148 lb)   Height: 5' 10\" (1.778 m)       Physical Examination: General appearance - alert, well appearing, and in no distress  Back exam - full range of motion, no tenderness, palpable spasm or pain on motion  Neurological - alert, oriented, normal speech, no focal findings or movement disorder noted  Musculoskeletal - The right shoulder is  normal to inspection. The patient has diminished range with pain  . The shoulderis tender to palpation . Bicep tendon: non-tender  The NEER test is positive. The Gilmore test:is positive   The Cross over test:is  negative. The Empty Can test:is  positive. Stressed ext rotation is:  negative. Stressed int rotation: negative. The Apprehension Sign is negative. The Lift off test is:  negative   Examination reveals the Painful Arch:  positive. The Labral Test is:  Positive. The Sulcus Sign is:  negative. Extremities - peripheral pulses normal, no pedal edema, no clubbing or cyanosis  Skin - normal coloration and turgor, no rashes, no suspicious skin lesions noted      Assessment/ Plan:   Diagnoses and all orders for this visit:    1. Acute pain of right shoulder  -     XR ARTHRO SHOULDER RT; Future    2. Tear of right glenoid labrum, initial encounter  -     MRI SHOULDER RT W CONT; Future  -     XR ARTHRO SHOULDER RT; Future     concern for labral tear  Follow-up Disposition:  Return if symptoms worsen or fail to improve. Pathophysiology, recovery and rehabilitation process discussed and questions answered   Counseling for 30 Minutes of the total visit duration   Pictures and figures used as necessary   Provided reassurance   Recommend activity modification   Recommend  lower impact activities-walking, Eliptical, Nordic Track, cycling or swimming   Follow up in 4 week(s)    I have discussed the diagnosis with the patient and the intended plan as seen in the above orders. The patient has received an after-visit summary and questions were answered concerning future plans. Medication Side Effects and Warnings were discussed with patient,  Patient Labs were reviewed and or requested, and  Patient Past Records were reviewed and or requested  yes     Pt agrees to call or return to clinic and/or go to closest ER with any worsening of symptoms. This may include, but not limited to increased fever (>100.4) with NSAIDS or Tylenol, increased edema, confusion, rash, worsening of presenting symptoms.

## 2019-01-29 ENCOUNTER — HOSPITAL ENCOUNTER (OUTPATIENT)
Dept: PHYSICAL THERAPY | Age: 24
Discharge: HOME | End: 2019-01-29
Payer: COMMERCIAL

## 2019-01-29 PROCEDURE — 97110 THERAPEUTIC EXERCISES: CPT

## 2019-01-29 PROCEDURE — 97016 VASOPNEUMATIC DEVICE THERAPY: CPT

## 2019-01-29 NOTE — PROGRESS NOTES
PT DAILY TREATMENT NOTE - [facility] 2-15    Patient Name: Jim Hernandez  Date:2019  : 1995  [x]  Patient  Verified  Payor: Danielle Schwab / Plan: [address] HMO / Product Type: HMO /    In time: 9:05am  Out time: 10:15am  Total Treatment Time (min): 70  Total Timed Codes (min): 55  1:1 Treatment Time ( only): --   Visit #: 4    Treatment Area: Right shoulder pain [M25.511]    SUBJECTIVE  Pain Level (0-10 scale): 3/10  Any medication changes, allergies to medications, adverse drug reactions, diagnosis change, or new procedure performed?: [x] No    [] Yes (see summary sheet for update)  Subjective functional status/changes:   [] No changes reported  Pt had follow-up with Dr. Amina Alvarez and is scheduled for MRI of right shoulder.     OBJECTIVE  Modality rationale: decrease inflammation and decrease pain to improve the patients ability to use right UE to perform functional activities and work duties   Type Additional Details   [] Estim: []Att   []Unatt    []TENS instruct                  []IFC  []Premod   []NMES                     []Other:  []w/US   []w/ice   []w/heat  Position:  Location:   []  Traction: [] Cervical       []Lumbar                       [] Prone          []Supine                       []Intermittent   []Continuous Lbs:  [] before manual  [] after manual  []w/heat   []  Ultrasound: []Continuous   [] Pulsed                       at: []1MHz   []3MHz Location:  W/cm2:   [] Paraffin         Location:   []w/heat   []  Ice     []  Heat  []  Ice massage Position:  Location:   []  Laser  []  Other: Position:  Location:     [x]  Vasopneumatic Device Pressure:       [] lo [x] med [] hi   Temperature: 34 degree   [x] Skin assessment post-treatment:  [x]intact []redness- no adverse reaction    []redness - adverse reaction:     55 min Therapeutic Exercise:  [x] See flow sheet :   Rationale: increase ROM, increase strength and improve coordination to improve the patients ability to use right UE to perform functional activities with increased ease    With   [] TE   [] TA   [] neuro   [] other: Patient Education: [x] Review HEP- pt given updated handout with exercises for HEP  [] Progressed/Changed HEP based on:   [] positioning   [] body mechanics   [] transfers   [] heat/ice application    [] other:      Other Objective/Functional Measures: --     Pain Level (0-10 scale) post treatment: 0/10    ASSESSMENT/Changes in Function:   Pt given updated handout with exercises for HEP. Plan to continue x 1 more PT visit, then hold until MRI results (MRI with contrast scheduled for 02/14/2019). []  See Plan of Care  []  See progress note/recertification  []  See Discharge Summary         Progress towards goals / Updated goals:  Short Term Goals: To be accomplished in 2 weeks:  1)  Pt will be Independent with HEP. MET  2) Pt will be able to reach above shoulder level without increase of pain. progressing  3) Pt will be able to wash face without pain. MET     Long Term Goals: To be accomplished in 4 weeks:  1) Pt will be able to tuck in the shirt without increase of pain. progressing  2) Pt will be able to reach above shoulder level without increase of pain. progressing  3) Pt will be able to lift >/= 25 pounds without pain. progressing  4) Pt will be able to sleep on involved side without waking due to pain.   progressing    PLAN  [x]  Upgrade activities as tolerated     [x]  Continue plan of care  []  Update interventions per flow sheet       []  Discharge due to:_  []  Other:_      Montana Trimble PT, DPT   1/29/2019  9:22 AM

## 2019-01-31 ENCOUNTER — HOSPITAL ENCOUNTER (OUTPATIENT)
Dept: PHYSICAL THERAPY | Age: 24
Discharge: HOME | End: 2019-01-31
Payer: COMMERCIAL

## 2019-01-31 PROCEDURE — 97016 VASOPNEUMATIC DEVICE THERAPY: CPT

## 2019-01-31 PROCEDURE — 97110 THERAPEUTIC EXERCISES: CPT

## 2019-01-31 PROCEDURE — 97140 MANUAL THERAPY 1/> REGIONS: CPT

## 2019-01-31 NOTE — PROGRESS NOTES
PT DAILY TREATMENT NOTE - [facility] 2-15    Patient Name: Bebeto Rodríguez  Date:2019  : 1995  [x]  Patient  Verified  Payor: Clarisa Chau / Plan: [address] HMO / Product Type: HMO /    In time: 9:00am  Out time: 10:16am  Total Treatment Time (min): 76  Total Timed Codes (min): 61  1:1 Treatment Time ( only): --   Visit #: 5    Treatment Area: Right shoulder pain [M25.511]    SUBJECTIVE  Pain Level (0-10 scale): 2/10  Any medication changes, allergies to medications, adverse drug reactions, diagnosis change, or new procedure performed?: [x] No    [] Yes (see summary sheet for update)  Subjective functional status/changes:   [] No changes reported  Pt states that right shoulder is \"ok\" as long as he avoids using it. Pt states that night pain is \"the same\".      OBJECTIVE  Modality rationale: decrease inflammation and decrease pain to improve the patients ability to use right UE to perform functional activities and work duties   Type Additional Details   [] Estim: []Att   []Unatt    []TENS instruct                  []IFC  []Premod   []NMES                     []Other:  []w/US   []w/ice   []w/heat  Position:  Location:   []  Traction: [] Cervical       []Lumbar                       [] Prone          []Supine                       []Intermittent   []Continuous Lbs:  [] before manual  [] after manual  []w/heat   []  Ultrasound: []Continuous   [] Pulsed                       at: []1MHz   []3MHz Location:  W/cm2:   [] Paraffin         Location:   []w/heat   []  Ice     []  Heat  []  Ice massage Position:  Location:   []  Laser  []  Other: Position:  Location:     [x]  Vasopneumatic Device Pressure:       [] lo [x] med [] hi   Temperature: 34 degree   [x] Skin assessment post-treatment:  [x]intact []redness- no adverse reaction    []redness - adverse reaction:     53 min Therapeutic Exercise:  [x] See flow sheet :   Rationale: increase ROM, increase strength and improve coordination to improve the patients ability to use right UE to perform functional activities with increased ease    8 min Manual Therapy: PNF D2 flexion/extension with light manual resistance in small range    Rationale: decrease pain, increase ROM and increase postural awareness to improve the patients ability to use right UE for functional and recreational activities with increased ease    With   [] TE   [] TA   [] neuro   [] other: Patient Education: [x] Review HEP-   [x] Progressed/Changed HEP based on:   [] positioning   [] body mechanics   [] transfers   [] heat/ice application    [] other:      Other Objective/Functional Measures: --     Pain Level (0-10 scale) post treatment: 0/10    ASSESSMENT/Changes in Function:   Pt able to perform bilateral ER with light resistance without increase in pain. Pt given yellow theraband for HEP. Plan to hold PT, pending right shoulder MRI results. []  See Plan of Care  []  See progress note/recertification  []  See Discharge Summary         Progress towards goals / Updated goals:  Short Term Goals: To be accomplished in 2 weeks:  1)  Pt will be Independent with HEP. MET  2) Pt will be able to reach above shoulder level without increase of pain. progressing  3) Pt will be able to wash face without pain. MET     Long Term Goals: To be accomplished in 4 weeks:  1) Pt will be able to tuck in the shirt without increase of pain. progressing  2) Pt will be able to reach above shoulder level without increase of pain. progressing  3) Pt will be able to lift >/= 25 pounds without pain. progressing  4) Pt will be able to sleep on involved side without waking due to pain.   progressing    PLAN  []  Upgrade activities as tolerated     []  Continue plan of care  []  Update interventions per flow sheet       []  Discharge due to:_  [x]  Other:_   Hold PT, pending MRI (02/14/2019)    Hair Atkinson, PT, DPT   1/31/2019  9:22 AM

## 2019-02-06 RX ORDER — CYCLOBENZAPRINE HCL 10 MG
10 TABLET ORAL
Qty: 30 TAB | Refills: 0 | Status: SHIPPED | OUTPATIENT
Start: 2019-02-06 | End: 2019-06-18

## 2019-02-14 ENCOUNTER — HOSPITAL ENCOUNTER (OUTPATIENT)
Dept: MRI IMAGING | Age: 24
Discharge: HOME | End: 2019-02-14
Attending: FAMILY MEDICINE
Payer: COMMERCIAL

## 2019-02-14 ENCOUNTER — HOSPITAL ENCOUNTER (OUTPATIENT)
Dept: GENERAL RADIOLOGY | Age: 24
Discharge: HOME | End: 2019-02-14
Attending: FAMILY MEDICINE
Payer: COMMERCIAL

## 2019-02-14 VITALS — WEIGHT: 150 LBS | BODY MASS INDEX: 21.52 KG/M2

## 2019-02-14 DIAGNOSIS — S43.431A TEAR OF RIGHT GLENOID LABRUM, INITIAL ENCOUNTER: ICD-10-CM

## 2019-02-14 DIAGNOSIS — M25.511 ACUTE PAIN OF RIGHT SHOULDER: ICD-10-CM

## 2019-02-14 PROCEDURE — A9585 GADOBUTROL INJECTION: HCPCS | Performed by: FAMILY MEDICINE

## 2019-02-14 PROCEDURE — 74011636320 HC RX REV CODE- 636/320: Performed by: RADIOLOGY

## 2019-02-14 PROCEDURE — 73222 MRI JOINT UPR EXTREM W/DYE: CPT

## 2019-02-14 PROCEDURE — 23350 INJECTION FOR SHOULDER X-RAY: CPT

## 2019-02-14 PROCEDURE — 74011250636 HC RX REV CODE- 250/636: Performed by: FAMILY MEDICINE

## 2019-02-14 RX ORDER — LIDOCAINE HYDROCHLORIDE 10 MG/ML
INJECTION, SOLUTION EPIDURAL; INFILTRATION; INTRACAUDAL; PERINEURAL
Status: DISPENSED
Start: 2019-02-14 | End: 2019-02-15

## 2019-02-14 RX ADMIN — IOHEXOL 5 ML: 300 INJECTION, SOLUTION INTRAVENOUS at 14:00

## 2019-02-14 RX ADMIN — GADOBUTROL 2 ML: 604.72 INJECTION INTRAVENOUS at 16:00

## 2019-02-15 NOTE — PROGRESS NOTES
Small flap tear and labrum, if still symptomatic will need to get surgical consult.   Will refer to Dr. Zoraida Park

## 2019-02-22 ENCOUNTER — OFFICE VISIT (OUTPATIENT)
Dept: INTERNAL MEDICINE CLINIC | Facility: CLINIC | Age: 24
End: 2019-02-22

## 2019-02-22 VITALS
WEIGHT: 148 LBS | BODY MASS INDEX: 21.19 KG/M2 | DIASTOLIC BLOOD PRESSURE: 70 MMHG | SYSTOLIC BLOOD PRESSURE: 112 MMHG | RESPIRATION RATE: 16 BRPM | HEART RATE: 67 BPM | HEIGHT: 70 IN | TEMPERATURE: 97.7 F

## 2019-02-22 DIAGNOSIS — Z11.3 SCREEN FOR STD (SEXUALLY TRANSMITTED DISEASE): ICD-10-CM

## 2019-02-22 DIAGNOSIS — B35.4 TINEA CORPORIS: Primary | ICD-10-CM

## 2019-02-22 RX ORDER — KETOCONAZOLE 20 MG/G
CREAM TOPICAL DAILY
Qty: 15 G | Refills: 0 | Status: SHIPPED | OUTPATIENT
Start: 2019-02-22 | End: 2019-06-18

## 2019-02-22 RX ORDER — SERTRALINE HYDROCHLORIDE 25 MG/1
50 TABLET, FILM COATED ORAL DAILY
COMMUNITY
End: 2019-12-10 | Stop reason: SDUPTHER

## 2019-02-22 NOTE — PROGRESS NOTES
Subjective:      Reuben Maya is a 21 y.o. male who presents today for acute care. Rash: he notes this for 2 months, it is itching and red and growing. Symptoms are noted on posterior trunk, he denies any other locations. He notes it worsens with hot showers and improves with lotion. He denies fevers, chills, sweats, drainage, infection. STD screen: he requests screening, he denies any symptoms at this time    There are no active problems to display for this patient. Current Outpatient Medications   Medication Sig Dispense Refill    sertraline (ZOLOFT) 25 mg tablet Take  by mouth daily.  cyclobenzaprine (FLEXERIL) 10 mg tablet Take 1 Tab by mouth three (3) times daily as needed for Muscle Spasm(s). 30 Tab 0    lamoTRIgine (LAMICTAL) 200 mg tablet Take  by mouth daily.  busPIRone (BUSPAR) 15 mg tablet Take 15 mg by mouth three (3) times daily.  albuterol (PROVENTIL HFA, VENTOLIN HFA, PROAIR HFA) 90 mcg/actuation inhaler Take 2 Puffs by inhalation every six (6) hours as needed for Wheezing. 1 Inhaler 5    Omeprazole delayed release (PRILOSEC D/R) 20 mg tablet Use daily for 3-5 days per flare 30 Tab 2    methylPREDNISolone (MEDROL DOSEPACK) 4 mg tablet Take as directed 1 Dose Pack 0    diclofenac potassium (CATAFLAM) 50 mg tablet Take 50 mg by mouth three (3) times daily.  FLUoxetine (PROZAC) 20 mg capsule Take  by mouth daily.  gabapentin (NEURONTIN) 400 mg capsule Take 400 mg by mouth nightly. No Known Allergies  Past Surgical History:   Procedure Laterality Date    HX ORTHOPAEDIC      right shoulder and both hips        Review of Systems    A comprehensive review of systems was negative except for that written in the HPI.      Objective:     Visit Vitals  /70   Pulse 67   Temp 97.7 °F (36.5 °C) (Oral)   Resp 16   Ht 5' 10\" (1.778 m)   Wt 148 lb (67.1 kg)   BMI 21.24 kg/m²     General appearance: alert, cooperative, no distress, appears stated age  Head: Normocephalic, without obvious abnormality, atraumatic  Eyes: negative  Lungs: clear to auscultation bilaterally  Heart: regular rate and rhythm, S1, S2 normal, no murmur, click, rub or gallop  Skin: hyperpigmented circular lesions noted to posterior mid trunk to right, very fine scaling noted. [place] with satellite lesions. No infection  Neurologic: Grossly normal  Psych: appropriate mood, speech, affect    Nursing note and vitals reviewed  Assessment/Plan:       ICD-10-CM ICD-9-CM    1. Tinea corporis B35.4 110.5 ketoconazole (NIZORAL) 2 % topical cream   2. Screen for STD (sexually transmitted disease) Z11.3 V74.5 CT/NG/T.VAGINALIS AMPLIFICATION       Follow-up Disposition:  Return if symptoms worsen or fail to improve. Advised him to call back or return to office if symptoms worsen/change/persist.  Discussed expected course/resolution/complications of diagnosis in detail with patient. Medication risks/benefits/costs/interactions/alternatives discussed with patient. He was given an after visit summary which includes diagnoses, current medications, & vitals. He expressed understanding with the diagnosis and plan.

## 2019-02-22 NOTE — PROGRESS NOTES
Chief Complaint   Patient presents with    Rash     rash on back for the last 2 months itching.  Other     would like to received lab orders for STI screening. 1. Have you been to the ER, urgent care clinic since your last visit? Hospitalized since your last visit? No    2. Have you seen or consulted any other health care providers outside of the [facility] since your last visit? Include any pap smears or colon screening.  No

## 2019-02-24 LAB
C TRACH RRNA SPEC QL NAA+PROBE: NEGATIVE
N GONORRHOEA RRNA SPEC QL NAA+PROBE: NEGATIVE
T VAGINALIS RRNA VAG QL NAA+PROBE: NEGATIVE

## 2019-02-25 DIAGNOSIS — S43.431A GLENOID LABRAL TEAR, RIGHT, INITIAL ENCOUNTER: Primary | ICD-10-CM

## 2019-03-05 ENCOUNTER — OFFICE VISIT (OUTPATIENT)
Dept: INTERNAL MEDICINE CLINIC | Facility: CLINIC | Age: 24
End: 2019-03-05

## 2019-03-05 VITALS
DIASTOLIC BLOOD PRESSURE: 61 MMHG | HEIGHT: 70 IN | BODY MASS INDEX: 20.76 KG/M2 | SYSTOLIC BLOOD PRESSURE: 100 MMHG | TEMPERATURE: 98.7 F | HEART RATE: 84 BPM | RESPIRATION RATE: 16 BRPM | WEIGHT: 145 LBS

## 2019-03-05 DIAGNOSIS — S16.1XXA STRAIN OF NECK MUSCLE, INITIAL ENCOUNTER: Primary | ICD-10-CM

## 2019-03-05 PROBLEM — S43.439A LABRAL TEAR OF SHOULDER: Status: ACTIVE | Noted: 2019-03-05

## 2019-03-05 RX ORDER — NAPROXEN 500 MG/1
500 TABLET ORAL 2 TIMES DAILY WITH MEALS
Qty: 60 TAB | Refills: 0 | Status: SHIPPED | OUTPATIENT
Start: 2019-03-05 | End: 2019-06-18

## 2019-03-05 NOTE — PROGRESS NOTES
Chief Complaint   Patient presents with    Neck Pain     neck pain that starts at the right side of the base of his neck and radiates up over his head with pain over his right eye causing a constant headache. 1. Have you been to the ER, urgent care clinic since your last visit? Hospitalized since your last visit? No    2. Have you seen or consulted any other health care providers outside of the [address] [place] [address] since your last visit? Include any pap smears or colon screening.  No

## 2019-03-05 NOTE — PROGRESS NOTES
Subjective:      Reese Flores is a 21 y.o. male who presents today for neck pain. He has a labral tear in his right shoulder and is scheduled to get surgery late March. Right Sided Neck Pain: neck pain that starts at the right side of the base of his neck and radiates up to his eye. Symptoms started 3 weeks ago. He denies numbness and tingling in the right arm. He has tried icing it and taking the flexeril and this helps. He notes symptoms are stable, no worsening. .     There are no active problems to display for this patient. Current Outpatient Medications   Medication Sig Dispense Refill    sertraline (ZOLOFT) 25 mg tablet Take  by mouth daily.  ketoconazole (NIZORAL) 2 % topical cream Apply  to affected area daily. 15 g 0    cyclobenzaprine (FLEXERIL) 10 mg tablet Take 1 Tab by mouth three (3) times daily as needed for Muscle Spasm(s). 30 Tab 0    methylPREDNISolone (MEDROL DOSEPACK) 4 mg tablet Take as directed 1 Dose Pack 0    diclofenac potassium (CATAFLAM) 50 mg tablet Take 50 mg by mouth three (3) times daily.  lamoTRIgine (LAMICTAL) 200 mg tablet Take  by mouth daily.  FLUoxetine (PROZAC) 20 mg capsule Take  by mouth daily.  gabapentin (NEURONTIN) 400 mg capsule Take 400 mg by mouth nightly.  busPIRone (BUSPAR) 15 mg tablet Take 15 mg by mouth three (3) times daily.  albuterol (PROVENTIL HFA, VENTOLIN HFA, PROAIR HFA) 90 mcg/actuation inhaler Take 2 Puffs by inhalation every six (6) hours as needed for Wheezing.  1 Inhaler 5    Omeprazole delayed release (PRILOSEC D/R) 20 mg tablet Use daily for 3-5 days per flare 30 Tab 2     No Known Allergies  Past Medical History:   Diagnosis Date    Asthma     Borderline personality disorder ([facility] [address]     Concussion     Migraines      Past Surgical History:   Procedure Laterality Date    HX ORTHOPAEDIC      right shoulder and both hips        Review of Systems     A comprehensive review of systems was negative except for that written in the HPI. Objective:     Visit Vitals  /61   Pulse 84   Temp 98.7 °F (37.1 °C) (Oral)   Resp 16   Ht 5' 10\" (1.778 m)   Wt 145 lb (65.8 kg)   BMI 20.81 kg/m²     General appearance: alert, cooperative, no distress, appears stated age  Head: Normocephalic, without obvious abnormality, atraumatic  Eyes: conjunctivae/corneas clear. PERRL, EOM's intact. Fundi benign  Ears: normal TM's and external ear canals AU  Nose: Nares normal. Septum midline. Mucosa normal. No drainage or sinus tenderness. Neck: supple, symmetrical, trachea midline, no adenopathy. Head compression test positive, trapezius is tight bilaterally. NO cervical spinal tenderness. Back: symmetric, no curvature. ROM normal. No CVA tenderness. Extremities: extremities normal, atraumatic, no cyanosis or edema  Neurologic: Alert and oriented X 3, normal strength and tone. Normal symmetric reflexes. Normal coordination and gait  Mental status: Alert, oriented, thought content appropriate  Cranial nerves: normal  Sensory: normal  Motor:grossly normal  Coordination: normal  Gait: Normal  Nursing note and vitals reviewed  Assessment/Plan:       ICD-10-CM ICD-9-CM    1. Strain of neck muscle, initial encounter S16. 1XXA 847.0 naproxen (NAPROSYN) 500 mg tablet       Follow-up Disposition:  Return if symptoms worsen or fail to improve. Advised him to call back or return to office if symptoms worsen/change/persist.  Discussed expected course/resolution/complications of diagnosis in detail with patient. Medication risks/benefits/costs/interactions/alternatives discussed with patient. He was given an after visit summary which includes diagnoses, current medications, & vitals. He expressed understanding with the diagnosis and plan.

## 2019-03-05 NOTE — PATIENT INSTRUCTIONS
Neck Strain or Sprain: Rehab Exercises  Your Care Instructions  Here are some examples of typical rehabilitation exercises for your condition. Start each exercise slowly. Ease off the exercise if you start to have pain. Your doctor or physical therapist will tell you when you can start these exercises and which ones will work best for you. How to do the exercises  Neck rotation    1. Sit in a firm chair, or stand up straight. 2. Keeping your chin level, turn your head to the right, and hold for 15 to 30 seconds. 3. Turn your head to the left and hold for 15 to 30 seconds. 4. Repeat 2 to 4 times to each side. Neck stretches    1. Look straight ahead, and tip your right ear to your right shoulder. Do not let your left shoulder rise up as you tip your head to the right. 2. Hold for 15 to 30 seconds. 3. Tilt your head to the left. Do not let your right shoulder rise up as you tip your head to the left. 4. Hold for 15 to 30 seconds. 5. Repeat 2 to 4 times to each side. Forward neck flexion    1. Sit in a firm chair, or stand up straight. 2. Bend your head forward. 3. Hold for 15 to 30 seconds. 4. Repeat 2 to 4 times. Lateral (side) bend strengthening    1. With your right hand, place your first two fingers on your right temple. 2. Start to bend your head to the side while using gentle pressure from your fingers to keep your head from bending. 3. Hold for about 6 seconds. 4. Repeat 8 to 12 times. 5. Switch hands and repeat the same exercise on your left side. Forward bend strengthening    1. Place your first two fingers of either hand on your forehead. 2. Start to bend your head forward while using gentle pressure from your fingers to keep your head from bending. 3. Hold for about 6 seconds. 4. Repeat 8 to 12 times. Neutral position strengthening    1. Using one hand, place your fingertips on the back of your head at the top of your neck.   2. Start to bend your head backward while using gentle pressure from your fingers to keep your head from bending. 3. Hold for about 6 seconds. 4. Repeat 8 to 12 times. Chin tuck    1. Lie on the floor with a rolled-up towel under your neck. Your head should be touching the floor. 2. Slowly bring your chin toward your chest.  3. Hold for a count of 6, and then relax for up to 10 seconds. 4. Repeat 8 to 12 times. Follow-up care is a key part of your treatment and safety. Be sure to make and go to all appointments, and call your doctor if you are having problems. It's also a good idea to know your test results and keep a list of the medicines you take. Where can you learn more? Go to http://joel-marjan.info/. Enter M679 in the search box to learn more about \"Neck Strain or Sprain: Rehab Exercises. \"  Current as of: September 20, 2018  Content Version: 11.9  © 3070-5334 [employer]. Care instructions adapted under license by [employer] (which disclaims liability or warranty for this information). If you have questions about a medical condition or this instruction, always ask your healthcare professional. [address] any warranty or liability for your use of this information.

## 2019-03-19 NOTE — ANCILLARY DISCHARGE INSTRUCTIONS
[facility]  [address], [address] [place] [address]  Phone: [phone]  Fax: [phone]    Discharge Summary  2-15    Patient name: Joanna Hutton  : 1995  Provider#: [id number]  Referral source: Diana Elizabeth MD      Medical/Treatment Diagnosis: Right shoulder pain [M25.511]     Prior Hospitalization: see medical history     Comorbidities: right SLAP repair   Prior Level of Function: pt works full-time as a cook/baker  Medications: Verified on Patient Summary List    Start of Care: 2019     Onset Date:2018   Visits from Start of Care: 5     Missed Visits: 0  Reporting Period : 2019 to 2019    Progress towards goals / Updated goals:  Short Term Goals: To be accomplished in 2 weeks:  1)  Pt will be Independent with HEP. MET  2) Pt will be able to reach above shoulder level without increase of pain. NOT MET  3) Pt will be able to wash face without pain. MET     Long Term Goals: To be accomplished in 4 weeks:  1) Pt will be able to tuck in the shirt without increase of pain. NOT MET  2) Pt will be able to reach above shoulder level without increase of pain. NOT MET  3) Pt will be able to lift >/= 25 pounds without pain. NOT MET  4) Pt will be able to sleep on involved side without waking due to pain.  NOT MET    ASSESSMENT/SUMMARY OF CARE: Pt participated in 5 outpatient PT sessions, 2019-2019, for right shoulder pain- tear of right glenoid labrum. Pt has history of right SLAP repair in 2013. Treatment included therapeutic exercise, manual therapy, [employer] (vasopneumatic compression with ice), pt education and home exercise program. Pt complains of no change in right shoulder pain, especially at night. Planned to resume PT, pending MRI results. Pt did not return; therefore, pt discharged.      RECOMMENDATIONS:  [x]Discontinue therapy: [x]Patient has reached or is progressing toward set goals      []Patient is non-compliant or has abdicated      [x]Due to lack of appreciable progress towards set goals    Horacio Anton, PT, DPT  3/19/2019

## 2019-06-18 ENCOUNTER — OFFICE VISIT (OUTPATIENT)
Dept: INTERNAL MEDICINE CLINIC | Facility: CLINIC | Age: 24
End: 2019-06-18

## 2019-06-18 VITALS
TEMPERATURE: 97.2 F | RESPIRATION RATE: 16 BRPM | WEIGHT: 152 LBS | BODY MASS INDEX: 21.76 KG/M2 | HEIGHT: 70 IN | DIASTOLIC BLOOD PRESSURE: 66 MMHG | SYSTOLIC BLOOD PRESSURE: 105 MMHG | HEART RATE: 75 BPM

## 2019-06-18 DIAGNOSIS — J02.9 PHARYNGITIS, UNSPECIFIED ETIOLOGY: Primary | ICD-10-CM

## 2019-06-18 LAB
S PYO AG THROAT QL: NEGATIVE
VALID INTERNAL CONTROL?: YES

## 2019-06-18 NOTE — PROGRESS NOTES
Chief Complaint   Patient presents with    Sore Throat     1. Have you been to the ER, urgent care clinic since your last visit? Hospitalized since your last visit? No    2. Have you seen or consulted any other health care providers outside of the [address] [place] [address] since your last visit? Include any pap smears or colon screening.  No

## 2019-06-18 NOTE — PROGRESS NOTES
Subjective:      Danni Velasquez is a 25 y.o. male who presents today for acute care. Sore throat: symptoms started Friday, they include sore throat, pain rated 6/10. He notes it is getting harder to swallow, pain is worse. He notes headache, earaches, malaise. He denies fevers, cough, nausea. He has tried ibuprofen. He notes symptoms are worsening. Sick contacts: none    Patient Active Problem List    Diagnosis Date Noted    Labral tear of shoulder 03/05/2019     Current Outpatient Medications   Medication Sig Dispense Refill    omeprazole (PRILOSEC) 20 mg capsule USE DAILY FOR 3-5 DAYS PER FLARE 30 Cap 0    sertraline (ZOLOFT) 25 mg tablet Take  by mouth daily.  lamoTRIgine (LAMICTAL) 200 mg tablet Take  by mouth daily.  busPIRone (BUSPAR) 15 mg tablet Take 15 mg by mouth three (3) times daily.  albuterol (PROVENTIL HFA, VENTOLIN HFA, PROAIR HFA) 90 mcg/actuation inhaler Take 2 Puffs by inhalation every six (6) hours as needed for Wheezing. 1 Inhaler 5    naproxen (NAPROSYN) 500 mg tablet Take 1 Tab by mouth two (2) times daily (with meals). 60 Tab 0    ketoconazole (NIZORAL) 2 % topical cream Apply  to affected area daily. 15 g 0    cyclobenzaprine (FLEXERIL) 10 mg tablet Take 1 Tab by mouth three (3) times daily as needed for Muscle Spasm(s). 30 Tab 0     No Known Allergies  Past Medical History:   Diagnosis Date    Asthma     Borderline personality disorder ([address]     Concussion     Migraines      Past Surgical History:   Procedure Laterality Date    HX ORTHOPAEDIC      right shoulder and both hips        Review of Systems    A comprehensive review of systems was negative except for that written in the HPI.      Objective:     Visit Vitals  /66   Pulse 75   Temp 97.2 °F (36.2 °C) (Oral)   Resp 16   Ht 5' 10\" (1.778 m)   Wt 152 lb (68.9 kg)   BMI 21.81 kg/m²     General appearance: alert, cooperative, no distress, appears stated age  Head: Normocephalic, without obvious abnormality, atraumatic  Eyes: negative  Ears: normal TM's and external ear canals AU  Nose: Nares normal. Septum midline. Mucosa normal. No drainage or sinus tenderness. Throat: abnormal findings: moderate oropharyngeal erythema, tonsillar hypertrophy 2+ and exudates present to right side  Neck: supple, symmetrical, trachea midline and no adenopathy  Lungs: clear to auscultation bilaterally  Heart: regular rate and rhythm, S1, S2 normal, no murmur, click, rub or gallop  Neurologic: Grossly normal  Nursing note and vitals reviewed  Assessment/Plan:       ICD-10-CM ICD-9-CM    1. Pharyngitis, unspecified etiology J02.9 462 AMB POC RAPID STREP A      CULTURE, STREP THROAT     Follow-up and Dispositions    · Return if symptoms worsen or fail to improve. Advised him to call back or return to office if symptoms worsen/change/persist.  Discussed expected course/resolution/complications of diagnosis in detail with patient. Medication risks/benefits/costs/interactions/alternatives discussed with patient. He was given an after visit summary which includes diagnoses, current medications, & vitals. He expressed understanding with the diagnosis and plan.

## 2019-06-18 NOTE — LETTER
NOTIFICATION RETURN TO WORK  
 
6/18/2019 12:15 PM 
 
Mr. Margo Lennon [address] [id number] To Whom It May Concern: 
 
Margo Lennon is currently under the care of Leandro Sousa.. He will return to work on Thursday. Please excuse him from work Monday through Wednesday as he is ill. If there are questions or concerns please have the patient contact our office.  
 
 
 
Sincerely, 
 
 
Nicole Crandall NP

## 2019-06-20 ENCOUNTER — TELEPHONE (OUTPATIENT)
Dept: INTERNAL MEDICINE CLINIC | Facility: CLINIC | Age: 24
End: 2019-06-20

## 2019-06-20 DIAGNOSIS — J02.0 STREP THROAT: Primary | ICD-10-CM

## 2019-06-20 LAB — S PYO THROAT QL CULT: ABNORMAL

## 2019-06-20 RX ORDER — AMOXICILLIN 500 MG/1
500 CAPSULE ORAL 2 TIMES DAILY
Qty: 20 CAP | Refills: 0 | Status: SHIPPED | OUTPATIENT
Start: 2019-06-20 | End: 2019-12-10 | Stop reason: ALTCHOICE

## 2019-06-20 NOTE — TELEPHONE ENCOUNTER
----- Message from Nehemiah Ortiz NP sent at 6/20/2019  1:56 PM EDT -----  Please call patient and let him know he is strep positive.  I am sending antibiotics to the pharmacy on file  ----- Message -----  From: Douglas Morales LPN  Sent: [phone]  12:11 PM  To: Nehemiah Ortiz NP

## 2019-06-20 NOTE — TELEPHONE ENCOUNTER
Patient was called and advised per Imelda Hugo DNP that his strep test is positive and antibiotics have been sent to his pharmacy on record. He verbalized understanding of information given.   Maite Arthur LPN

## 2019-12-10 ENCOUNTER — OFFICE VISIT (OUTPATIENT)
Dept: INTERNAL MEDICINE CLINIC | Age: 24
End: 2019-12-10

## 2019-12-10 VITALS
HEIGHT: 70 IN | HEART RATE: 62 BPM | DIASTOLIC BLOOD PRESSURE: 80 MMHG | WEIGHT: 146.2 LBS | TEMPERATURE: 98.3 F | SYSTOLIC BLOOD PRESSURE: 132 MMHG | OXYGEN SATURATION: 100 % | BODY MASS INDEX: 20.93 KG/M2 | RESPIRATION RATE: 16 BRPM

## 2019-12-10 DIAGNOSIS — K21.00 GASTROESOPHAGEAL REFLUX DISEASE WITH ESOPHAGITIS: Primary | ICD-10-CM

## 2019-12-10 DIAGNOSIS — Z11.3 SCREEN FOR STD (SEXUALLY TRANSMITTED DISEASE): ICD-10-CM

## 2019-12-10 DIAGNOSIS — K59.01 SLOW TRANSIT CONSTIPATION: ICD-10-CM

## 2019-12-10 DIAGNOSIS — K62.89 PROCTALGIA: ICD-10-CM

## 2019-12-10 PROBLEM — F31.32 BIPOLAR AFFECTIVE DISORDER, CURRENTLY DEPRESSED, MODERATE (HCC): Status: ACTIVE | Noted: 2017-04-08

## 2019-12-10 PROBLEM — F41.9 ANXIETY: Status: ACTIVE | Noted: 2017-04-08

## 2019-12-10 RX ORDER — HYDROCORTISONE 25 MG/G
CREAM TOPICAL 4 TIMES DAILY
Qty: 30 G | Refills: 0 | Status: SHIPPED | OUTPATIENT
Start: 2019-12-10 | End: 2020-08-24

## 2019-12-10 RX ORDER — OMEPRAZOLE 40 MG/1
40 CAPSULE, DELAYED RELEASE ORAL DAILY
Qty: 30 CAP | Refills: 1 | Status: SHIPPED | OUTPATIENT
Start: 2019-12-10 | End: 2019-12-31 | Stop reason: ALTCHOICE

## 2019-12-10 RX ORDER — SERTRALINE HYDROCHLORIDE 50 MG/1
TABLET, FILM COATED ORAL
Refills: 6 | COMMUNITY
Start: 2019-10-06 | End: 2021-05-17

## 2019-12-10 RX ORDER — POLYETHYLENE GLYCOL 3350 17 G/17G
17 POWDER, FOR SOLUTION ORAL DAILY
Qty: 454 G | Refills: 1
Start: 2019-12-10 | End: 2021-05-17

## 2019-12-10 RX ORDER — PROPRANOLOL HYDROCHLORIDE 40 MG/1
TABLET ORAL
Refills: 2 | COMMUNITY
Start: 2019-10-24 | End: 2021-05-17

## 2019-12-10 NOTE — PROGRESS NOTES
HISTORY OF PRESENT ILLNESS  Andie Ortega is a 25 y.o. male. Epigastric Pain    The history is provided by the patient. This is a recurrent problem. Episode onset: 2 wks. The problem occurs daily. The problem has not changed since onset. The pain is associated with an unknown factor. The pain is moderate. Associated symptoms include anorexia, hematochezia and constipation. Pertinent negatives include no melena, no nausea, no vomiting, no dysuria and no back pain. Nothing worsens the pain. The pain is relieved by eating. His past medical history is significant for GERD. Constipation    The history is provided by the patient. This is a new problem. Episode onset: 2 wks. The stool is described as hard. Associated symptoms include constipation. Pertinent negatives include no dysuria, no nausea, no back pain and no vomiting. Associated symptoms comments: Has anal pain. He has tried stimulants (x 1) for the symptoms. The treatment provided no relief. Review of Systems   Gastrointestinal: Positive for anorexia, constipation and hematochezia. Negative for melena, nausea and vomiting. Genitourinary: Negative for dysuria. Musculoskeletal: Negative for back pain. Physical Exam  Vitals signs and nursing note reviewed. Constitutional:       General: He is not in acute distress. Cardiovascular:      Rate and Rhythm: Normal rate and regular rhythm. Heart sounds: No murmur. No friction rub. No gallop. Pulmonary:      Effort: Pulmonary effort is normal.      Breath sounds: Normal breath sounds. Abdominal:      General: Abdomen is flat. Palpations: Abdomen is soft. Tenderness: There is tenderness in the epigastric area. There is no guarding or rebound. Genitourinary:     Rectum: Tenderness present. No mass, anal fissure or external hemorrhoid. ASSESSMENT and PLAN  Diagnoses and all orders for this visit:    1.  Gastroesophageal reflux disease with esophagitis  -     omeprazole (PRILOSEC) 40 mg capsule; Take 1 Cap by mouth daily for 21 days. USE DAILY FOR 3-5 DAYS PER FLARE    2. Screen for STD (sexually transmitted disease)  -     HIV 1/2 AG/AB, 4TH GENERATION,W RFLX CONFIRM  -     HSV-1 AB, IGG GLYCOPROTEIN, G-SPECIFIC  -     CHLAMYDIA/GC PCR    3. Slow transit constipation  -     polyethylene glycol (MIRALAX) 17 gram/dose powder; Take 17 g by mouth daily. 4. Proctalgia- likely mild fissure  -     hydrocortisone (ANUSOL-HC) 2.5 % rectal cream; Insert  into rectum four (4) times daily.

## 2019-12-10 NOTE — PROGRESS NOTES
Chief Complaint   Patient presents with    GERD     1. Have you been to the ER, urgent care clinic since your last visit? Hospitalized since your last visit? No    2. Have you seen or consulted any other health care providers outside of the [address] [place] [address] since your last visit? Include any pap smears or colon screening.  No

## 2019-12-11 LAB
HIV 1+2 AB+HIV1 P24 AG SERPL QL IA: NON REACTIVE
HSV1 IGG SER IA-ACNC: <0.91 INDEX (ref 0–0.9)

## 2019-12-23 ENCOUNTER — TELEPHONE (OUTPATIENT)
Dept: INTERNAL MEDICINE CLINIC | Age: 24
End: 2019-12-23

## 2019-12-24 NOTE — TELEPHONE ENCOUNTER
Spoke with patient, urine specimen was not done in office at visit. Patient will come by the office to complete.  Understanding verbalized

## 2019-12-31 ENCOUNTER — OFFICE VISIT (OUTPATIENT)
Dept: INTERNAL MEDICINE CLINIC | Age: 24
End: 2019-12-31

## 2019-12-31 VITALS
DIASTOLIC BLOOD PRESSURE: 69 MMHG | TEMPERATURE: 97.8 F | BODY MASS INDEX: 21.36 KG/M2 | SYSTOLIC BLOOD PRESSURE: 127 MMHG | HEIGHT: 70 IN | RESPIRATION RATE: 18 BRPM | WEIGHT: 149.2 LBS | OXYGEN SATURATION: 96 % | HEART RATE: 66 BPM

## 2019-12-31 DIAGNOSIS — Z11.3 SCREEN FOR STD (SEXUALLY TRANSMITTED DISEASE): ICD-10-CM

## 2019-12-31 DIAGNOSIS — K21.00 GASTROESOPHAGEAL REFLUX DISEASE WITH ESOPHAGITIS: Primary | ICD-10-CM

## 2019-12-31 RX ORDER — FAMOTIDINE 40 MG/1
40 TABLET, FILM COATED ORAL DAILY
Qty: 30 TAB | Refills: 3 | Status: SHIPPED | OUTPATIENT
Start: 2019-12-31 | End: 2021-05-17

## 2019-12-31 NOTE — PROGRESS NOTES
HISTORY OF PRESENT ILLNESS  Elvin Bull is a 25 y.o. male. Epigastric Pain    The history is provided by the patient (on regular dosing of higher strength omeprazole- feels much better). This is a recurrent problem. The problem occurs rarely. The problem has been gradually improving. The pain is mild. Associated symptoms include constipation. Pertinent negatives include no hematochezia, no melena, no nausea and no vomiting. His past medical history is significant for GERD. The patient's surgical history non-contributory. Constipation    The history is provided by the patient (nearly resolved). This is a new problem. Associated symptoms include constipation. Pertinent negatives include no nausea and no vomiting. Treatments tried: miralax. The treatment provided significant relief. Review of Systems   Gastrointestinal: Positive for constipation. Negative for hematochezia, melena, nausea and vomiting. Physical Exam  Vitals signs and nursing note reviewed. Skin:     General: Skin is warm and dry. Neurological:      Mental Status: He is alert. Psychiatric:         Behavior: Behavior normal.         ASSESSMENT and PLAN  Diagnoses and all orders for this visit:    1. Gastroesophageal reflux disease with esophagitis  -     famotidine (PEPCID) 40 mg tablet; Take 1 Tab by mouth daily. Indications: gastroesophageal reflux disease  - If sx relapse consider See gastroenterologist as directed     2. Screen for STD (sexually transmitted disease)  -     CHLAMYDIA/GC PCR    3.  Constipation- taper miralax to prn

## 2020-01-03 LAB
C TRACH RRNA SPEC QL NAA+PROBE: NEGATIVE
N GONORRHOEA RRNA SPEC QL NAA+PROBE: NEGATIVE

## 2020-01-13 RX ORDER — OMEPRAZOLE 40 MG/1
CAPSULE, DELAYED RELEASE ORAL
Qty: 30 CAP | Refills: 1 | Status: SHIPPED | OUTPATIENT
Start: 2020-01-13 | End: 2020-01-28 | Stop reason: SDUPTHER

## 2020-01-28 RX ORDER — OMEPRAZOLE 40 MG/1
CAPSULE, DELAYED RELEASE ORAL
Qty: 30 CAP | Refills: 1 | Status: SHIPPED | OUTPATIENT
Start: 2020-01-28 | End: 2021-05-17

## 2020-01-28 NOTE — TELEPHONE ENCOUNTER
Pharmacy is requesting a 90 day supply    Requested Prescriptions     Pending Prescriptions Disp Refills    omeprazole (PRILOSEC) 40 mg capsule 30 Cap 1         [facility]/pharmacy #[id number]- [facility], [address] [place] [address]

## 2020-03-24 ENCOUNTER — TELEPHONE (OUTPATIENT)
Dept: INTERNAL MEDICINE CLINIC | Age: 25
End: 2020-03-24

## 2020-03-24 NOTE — TELEPHONE ENCOUNTER
Patient states his symptoms of cough, shortness of breath and weakness started this morning. States his boss was in [place] in early March and he was in contact with her when she got back, around the 12th. She has not come down with any symptoms. Please call to advise.

## 2020-03-26 NOTE — TELEPHONE ENCOUNTER
Spoke with pt - states has had symptoms of cough with sob x3 days. No fever. He was concerned that his boss had gone to [place] in early March even though she does not have any symptoms. Has only taken Tylenol. Suggested if symptoms worsen he could go to one of our flu clinics. Will forward to Sekou Dietz.

## 2020-03-29 ENCOUNTER — TELEPHONE (OUTPATIENT)
Dept: INTERNAL MEDICINE CLINIC | Age: 25
End: 2020-03-29

## 2020-03-29 NOTE — TELEPHONE ENCOUNTER
Call received from call service. Patient states he has dry cough and worsening shortness of breath since Tuesday. He states the sob is now affecting his sleep. No fever.  Advised to report to ER for further eval

## 2020-07-16 LAB — SARS-COV-2, NAA: NEGATIVE

## 2020-08-24 ENCOUNTER — OFFICE VISIT (OUTPATIENT)
Dept: URGENT CARE | Age: 25
End: 2020-08-24
Payer: COMMERCIAL

## 2020-08-24 VITALS — OXYGEN SATURATION: 98 % | HEART RATE: 94 BPM | RESPIRATION RATE: 16 BRPM | TEMPERATURE: 98.1 F

## 2020-08-24 DIAGNOSIS — Z20.822 EXPOSURE TO COVID-19 VIRUS: Primary | ICD-10-CM

## 2020-08-24 PROCEDURE — 99202 OFFICE O/P NEW SF 15 MIN: CPT | Performed by: FAMILY MEDICINE

## 2020-08-24 NOTE — PROGRESS NOTES
This patient was seen in Flu Clinic at [address] [place] [address] Urgent Care while in their vehicle due to COVID-19 pandemic with PPE and focused examination in order to decrease community viral transmission. The patient/guardian gave verbal consent to treat. The history is provided by the patient.       Asymptomatic  Was in social contact with 2 friend who tested positive yesterday -    Past Medical History:   Diagnosis Date    Asthma     Borderline personality disorder ([address]     Concussion     Migraines         Past Surgical History:   Procedure Laterality Date    HX ORTHOPAEDIC      right shoulder and both hips         Family History   Problem Relation Age of Onset    Asthma Mother     Asthma Father         Social History     Socioeconomic History    Marital status: SINGLE     Spouse name: Not on file    Number of children: Not on file    Years of education: Not on file    Highest education level: Not on file   Occupational History    Not on file   Social Needs    Financial resource strain: Not on file    Food insecurity     Worry: Not on file     Inability: Not on file    Transportation needs     Medical: Not on file     Non-medical: Not on file   Tobacco Use    Smoking status: Never Smoker    Smokeless tobacco: Never Used   Substance and Sexual Activity    Alcohol use: No     Frequency: Never    Drug use: Not on file    Sexual activity: Not on file   Lifestyle    Physical activity     Days per week: Not on file     Minutes per session: Not on file    Stress: Not on file   Relationships    Social connections     Talks on phone: Not on file     Gets together: Not on file     Attends Faith service: Not on file     Active member of club or organization: Not on file     Attends meetings of clubs or organizations: Not on file     Relationship status: Not on file    Intimate partner violence     Fear of current or ex partner: Not on file     Emotionally abused: Not on file     Physically abused: Not on file     Forced sexual activity: Not on file   Other Topics Concern    Not on file   Social History Narrative    Not on file                ALLERGIES: Patient has no known allergies. Review of Systems   All other systems reviewed and are negative. Vitals:    08/24/20 1022   Pulse: 94   Resp: 16   Temp: 98.1 °F (36.7 °C)   SpO2: 98%       Physical Exam  Vitals signs and nursing note reviewed. Constitutional:       General: He is not in acute distress. Appearance: He is not ill-appearing. Pulmonary:      Effort: Pulmonary effort is normal. No respiratory distress. Breath sounds: Normal breath sounds. MDM    Procedures      ICD-10-CM ICD-9-CM    1. Exposure to COVID-19 virus  Z20.828 V01.79 NOVEL CORONAVIRUS (COVID-19)        Tested for Covid-19 and advised to quarantine until result comes back. No orders of the defined types were placed in this encounter. No results found for any visits on 08/24/20. The patients condition was discussed with the patient and they understand. The patient is to follow up with primary care doctor. If signs and symptoms become worse the pt is to go to the ER. The patient is to take medications as prescribed.

## 2020-08-26 LAB — SARS-COV-2, NAA: NOT DETECTED

## 2020-11-09 ENCOUNTER — OFFICE VISIT (OUTPATIENT)
Dept: NEUROLOGY | Age: 25
End: 2020-11-09
Payer: COMMERCIAL

## 2020-11-09 VITALS — RESPIRATION RATE: 18 BRPM | HEART RATE: 75 BPM | OXYGEN SATURATION: 98 %

## 2020-11-09 DIAGNOSIS — S06.0X0A CONCUSSION WITHOUT LOSS OF CONSCIOUSNESS, INITIAL ENCOUNTER: Primary | ICD-10-CM

## 2020-11-09 DIAGNOSIS — G43.709 CHRONIC MIGRAINE W/O AURA W/O STATUS MIGRAINOSUS, NOT INTRACTABLE: ICD-10-CM

## 2020-11-09 DIAGNOSIS — R41.89 COGNITIVE CHANGES: ICD-10-CM

## 2020-11-09 DIAGNOSIS — Z87.820 PERSONAL HISTORY OF MULTIPLE CONCUSSIONS: ICD-10-CM

## 2020-11-09 PROCEDURE — 99205 OFFICE O/P NEW HI 60 MIN: CPT | Performed by: PSYCHIATRY & NEUROLOGY

## 2020-11-09 RX ORDER — ZOLPIDEM TARTRATE 5 MG/1
TABLET ORAL
COMMUNITY

## 2020-11-09 RX ORDER — ERENUMAB-AOOE 70 MG/ML
70 INJECTION SUBCUTANEOUS
Qty: 1 EACH | Refills: 11 | Status: SHIPPED | OUTPATIENT
Start: 2020-11-09 | End: 2021-05-17 | Stop reason: SINTOL

## 2020-11-09 RX ORDER — ERENUMAB-AOOE 70 MG/ML
70 INJECTION SUBCUTANEOUS ONCE
Qty: 1 EACH | Refills: 0 | Status: SHIPPED | COMMUNITY
Start: 2020-11-09 | End: 2020-11-09
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
## 2020-11-09 NOTE — PATIENT INSTRUCTIONS
[facility]   Statement to Patients  April 1, 2014      In an effort to ensure the large volume of patient prescription refills is processed in the most efficient and expeditious manner, we are asking our patients to assist us by calling your Pharmacy for all prescription refills, this will include also your  Mail Order Pharmacy. The pharmacy will contact our office electronically to continue the refill process. Please do not wait until the last minute to call your pharmacy. We need at least 48 hours (2days) to fill prescriptions. We also encourage you to call your pharmacy before going to  your prescription to make sure it is ready. With regard to controlled substance prescription refill requests (narcotic refills) that need to be picked up at our office, we ask your cooperation by providing us with at least 72 hours (3days) notice that you will need a refill. We will not refill narcotic prescription refill requests after 4:00pm on any weekday, Monday through Thursday, or after 2:00pm on Fridays, or on the weekends. We encourage everyone to explore another way of getting your prescription refill request processed using [employer], our patient web portal through our electronic medical record system. [employer] is an efficient and effective way to communicate your medication request directly to the office and  downloadable as an jean on your smart phone . [employer] also features a review functionality that allows you to view your medication list as well as leave messages for your physician. Are you ready to get connected? If so please review the attatched instructions or speak to any of our staff to get you set up right away! Thank you so much for your cooperation. Should you have any questions please contact our Practice Administrator.     The Physicians and Staff,  [facility]

## 2020-11-10 ENCOUNTER — TELEPHONE (OUTPATIENT)
Dept: NEUROLOGY | Age: 25
End: 2020-11-10

## 2020-11-10 NOTE — TELEPHONE ENCOUNTER
Re: Biju Kraft approved    Approval rec'd from [employer] via [facility]    Effective 10/11/20-11/10/21  PA # [id number]    Pharmacy notified of approval via [facility].

## 2020-12-04 ENCOUNTER — HOSPITAL ENCOUNTER (OUTPATIENT)
Dept: MRI IMAGING | Age: 25
Discharge: HOME | End: 2020-12-04
Attending: PSYCHIATRY & NEUROLOGY
Payer: COMMERCIAL

## 2020-12-04 DIAGNOSIS — S06.0X0A CONCUSSION WITHOUT LOSS OF CONSCIOUSNESS, INITIAL ENCOUNTER: ICD-10-CM

## 2020-12-04 DIAGNOSIS — Z87.820 PERSONAL HISTORY OF MULTIPLE CONCUSSIONS: ICD-10-CM

## 2020-12-04 DIAGNOSIS — R41.89 COGNITIVE CHANGES: ICD-10-CM

## 2020-12-04 PROCEDURE — 70551 MRI BRAIN STEM W/O DYE: CPT | Performed by: PSYCHIATRY & NEUROLOGY

## 2020-12-07 NOTE — PROGRESS NOTES
Brain scan looks good. No evidence of old trauma or stroke. No change to the current management discussed at our last visit.

## 2021-01-12 NOTE — LETTER
1/28/2019 4:24 PM 
 
Mr. Racquel Menezes [address] [id number] Thursday February 14th 2019 Injection 1:45pm 
MRI 2:15pm  
 
 
 
[address] [facility] [facility] 
[phone] Sincerely, Vinod Deshpande MD 
 
 COVID-19 Virtual Visit     Assessment/Plan: Patient had positive exposure to covid  Is not having symptoms currently  Will obtain covid swab and will notify once swab is back  Did advise to continue to self quarantine and will follow up later this week  Problem List Items Addressed This Visit        Other    Exposure to COVID-19 virus - Primary    Relevant Orders    Novel Coronavirus (COVID-19), PCR [facility] - Collected in Office         Disposition:     I have spent 15 minutes directly with the patient  Encounter provider Dayton Rodriguez, [facility]    Provider located at [address] [place] [address] Rd  [address] [id number]    Recent Visits  No visits were found meeting these conditions  Showing recent visits within past 7 days and meeting all other requirements     Today's Visits  Date Type Provider Dept   01/12/21 Telemedicine [facility]   Showing today's visits and meeting all other requirements     Future Appointments  No visits were found meeting these conditions  Showing future appointments within next 150 days and meeting all other requirements        Patient agrees to participate in a virtual check in via telephone or video visit instead of presenting to the office to address urgent/immediate medical needs  Patient is aware this is a billable service  After connecting through Telephone, the patient was identified by name and date of birth  Evens White was informed that this was a telemedicine visit and that the exam was being conducted confidentially over secure lines  My office door was closed  No one else was in the room  Evens White acknowledged consent and understanding of privacy and security of the telemedicine visit  I informed the patient that I have reviewed her record in Epic and presented the opportunity for her to ask any questions regarding the visit today   The patient agreed to participate  It was my intent to perform this visit via video technology but the patient was not able to do a video connection so the visit was completed via audio telephone only  Subjective: Jyotsna Mejia is a 62 y o  female who is concerned about COVID-19  Patient is currently asymptomatic  Patient denies fever, chills, fatigue, malaise, congestion, rhinorrhea, sore throat, anosmia, loss of taste, cough, shortness of breath, chest tightness, abdominal pain, nausea, vomiting, diarrhea, myalgias and headaches  Date of exposure: 1/4/2021    Exposure:   Contact with a person who is under investigation (PUI) for or who is positive for COVID-19 within the last 14 days?: Yes    Hospitalized recently for fever and/or lower respiratory symptoms?: No      Currently a healthcare worker that is involved in direct patient care?: No      Works in a special setting where the risk of COVID-19 transmission may be high? (this may include long-term care, correctional and prison facilities; homeless shelters; assisted-living facilities and group homes ): No      Resident in a special setting where the risk of COVID-19 transmission may be high? (this may include long-term care, correctional and prison facilities; homeless shelters; assisted-living facilities and group homes ): No      No results found for: Kavya Allen, [address] [facility], [address]  No past medical history on file  No past surgical history on file  No current outpatient medications on file  No current facility-administered medications for this visit  Not on File    Review of Systems   Constitutional: Negative for chills, fatigue and fever  HENT: Negative for congestion, rhinorrhea and sore throat  Respiratory: Negative for cough, chest tightness and shortness of breath  Gastrointestinal: Negative for abdominal pain, diarrhea, nausea and vomiting  Musculoskeletal: Negative for myalgias     Neurological: Negative for headaches  Objective: There were no vitals filed for this visit  Physical Exam  Constitutional:       Appearance: Normal appearance  Neurological:      Mental Status: She is alert  Psychiatric:         Mood and Affect: Mood normal          Behavior: Behavior normal          Thought Content: Thought content normal          Judgment: Judgment normal        VIRTUAL VISIT DISCLAIMER    Charles Garcia acknowledges that she has consented to an online visit or consultation  She understands that the online visit is based solely on information provided by her, and that, in the absence of a face-to-face physical evaluation by the physician, the diagnosis she receives is both limited and provisional in terms of accuracy and completeness  This is not intended to replace a full medical face-to-face evaluation by the physician  Charles Radha understands and accepts these terms

## 2021-01-18 ENCOUNTER — OFFICE VISIT (OUTPATIENT)
Dept: NEUROLOGY | Age: 26
End: 2021-01-18
Payer: COMMERCIAL

## 2021-01-18 VITALS — TEMPERATURE: 97.5 F

## 2021-01-18 DIAGNOSIS — R41.3 MEMORY LOSS: ICD-10-CM

## 2021-01-18 DIAGNOSIS — R41.840 ATTENTION DEFICIT: ICD-10-CM

## 2021-01-18 DIAGNOSIS — F41.9 ANXIETY: ICD-10-CM

## 2021-01-18 DIAGNOSIS — R41.89 COGNITIVE CHANGES: ICD-10-CM

## 2021-01-18 DIAGNOSIS — Z87.820 PERSONAL HISTORY OF MULTIPLE CONCUSSIONS: ICD-10-CM

## 2021-01-18 DIAGNOSIS — F31.32 BIPOLAR AFFECTIVE DISORDER, CURRENTLY DEPRESSED, MODERATE (HCC): ICD-10-CM

## 2021-01-18 DIAGNOSIS — F07.81 POST CONCUSSION SYNDROME: Primary | ICD-10-CM

## 2021-01-18 DIAGNOSIS — S06.0X0A CONCUSSION WITHOUT LOSS OF CONSCIOUSNESS, INITIAL ENCOUNTER: ICD-10-CM

## 2021-01-18 DIAGNOSIS — Z87.820 HISTORY OF MULTIPLE CONCUSSIONS: ICD-10-CM

## 2021-01-18 PROCEDURE — 90791 PSYCH DIAGNOSTIC EVALUATION: CPT | Performed by: CLINICAL NEUROPSYCHOLOGIST
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
## 2021-03-23 ENCOUNTER — OFFICE VISIT (OUTPATIENT)
Dept: NEUROLOGY | Age: 26
End: 2021-03-23
Payer: COMMERCIAL

## 2021-03-23 DIAGNOSIS — Z87.820 HISTORY OF MULTIPLE CONCUSSIONS: ICD-10-CM

## 2021-03-23 DIAGNOSIS — F41.9 ANXIETY: ICD-10-CM

## 2021-03-23 DIAGNOSIS — F07.81 POST CONCUSSION SYNDROME: Primary | ICD-10-CM

## 2021-03-23 DIAGNOSIS — R41.840 ATTENTION DEFICIT: ICD-10-CM

## 2021-03-23 PROCEDURE — 99214 OFFICE O/P EST MOD 30 MIN: CPT | Performed by: NURSE PRACTITIONER

## 2021-03-23 RX ORDER — AMITRIPTYLINE HYDROCHLORIDE 25 MG/1
25 TABLET, FILM COATED ORAL
Qty: 30 TAB | Refills: 0 | Status: SHIPPED | OUTPATIENT
Start: 2021-03-23 | End: 2021-04-15 | Stop reason: SDUPTHER

## 2021-03-23 NOTE — PATIENT INSTRUCTIONS
[facility]   Statement to Patients  April 1, 2014      In an effort to ensure the large volume of patient prescription refills is processed in the most efficient and expeditious manner, we are asking our patients to assist us by calling your Pharmacy for all prescription refills, this will include also your  Mail Order Pharmacy. The pharmacy will contact our office electronically to continue the refill process. Please do not wait until the last minute to call your pharmacy. We need at least 48 hours (2days) to fill prescriptions. We also encourage you to call your pharmacy before going to  your prescription to make sure it is ready. With regard to controlled substance prescription refill requests (narcotic refills) that need to be picked up at our office, we ask your cooperation by providing us with at least 72 hours (3days) notice that you will need a refill. We will not refill narcotic prescription refill requests after 4:00pm on any weekday, Monday through Thursday, or after 2:00pm on Fridays, or on the weekends. We encourage everyone to explore another way of getting your prescription refill request processed using [employer], our patient web portal through our electronic medical record system. [employer] is an efficient and effective way to communicate your medication request directly to the office and  downloadable as an jean on your smart phone . [employer] also features a review functionality that allows you to view your medication list as well as leave messages for your physician. Are you ready to get connected? If so please review the attatched instructions or speak to any of our staff to get you set up right away! Thank you so much for your cooperation. Should you have any questions please contact our Practice Administrator.     The Physicians and Staff,  [facility]
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
## 2021-03-24 VITALS
OXYGEN SATURATION: 98 % | DIASTOLIC BLOOD PRESSURE: 76 MMHG | HEART RATE: 74 BPM | RESPIRATION RATE: 18 BRPM | SYSTOLIC BLOOD PRESSURE: 126 MMHG

## 2021-03-26 ENCOUNTER — OFFICE VISIT (OUTPATIENT)
Dept: NEUROLOGY | Age: 26
End: 2021-03-26
Payer: COMMERCIAL

## 2021-03-26 DIAGNOSIS — F41.8 ANXIETY WITH SOMATIC FEATURES: ICD-10-CM

## 2021-03-26 DIAGNOSIS — Z87.820 HISTORY OF MULTIPLE CONCUSSIONS: ICD-10-CM

## 2021-03-26 DIAGNOSIS — F32.1 MODERATE MAJOR DEPRESSION (HCC): ICD-10-CM

## 2021-03-26 DIAGNOSIS — F07.81 POST CONCUSSION SYNDROME: Primary | ICD-10-CM

## 2021-03-26 DIAGNOSIS — R41.840 ATTENTION DEFICIT: ICD-10-CM

## 2021-03-26 PROCEDURE — 96132 NRPSYC TST EVAL PHYS/QHP 1ST: CPT | Performed by: CLINICAL NEUROPSYCHOLOGIST

## 2021-03-26 PROCEDURE — 96138 PSYCL/NRPSYC TECH 1ST: CPT | Performed by: CLINICAL NEUROPSYCHOLOGIST

## 2021-03-26 PROCEDURE — 96136 PSYCL/NRPSYC TST PHY/QHP 1ST: CPT | Performed by: CLINICAL NEUROPSYCHOLOGIST

## 2021-03-26 PROCEDURE — 96137 PSYCL/NRPSYC TST PHY/QHP EA: CPT | Performed by: CLINICAL NEUROPSYCHOLOGIST

## 2021-03-26 PROCEDURE — 96139 PSYCL/NRPSYC TST TECH EA: CPT | Performed by: CLINICAL NEUROPSYCHOLOGIST

## 2021-03-26 PROCEDURE — 96133 NRPSYC TST EVAL PHYS/QHP EA: CPT | Performed by: CLINICAL NEUROPSYCHOLOGIST

## 2021-03-26 NOTE — LETTER
3/29/2021 Patient: Slick Cai YOB: 1995 Date of Visit: 3/26/2021 Joana Manning NP 
[address] [place] [address] [address] [facility] [id number] [address] [employer] Dear Joana Manning NP, Thank you for referring   Slick Cai to [facility] for evaluation. My notes for this consultation are attached. If you have questions, please do not hesitate to call me. I look forward to following your patient along with you. Sincerely, Saul De La Torre PsyD
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
## 2021-04-19 RX ORDER — AMITRIPTYLINE HYDROCHLORIDE 25 MG/1
25 TABLET, FILM COATED ORAL
Qty: 90 TAB | Refills: 0 | Status: SHIPPED | OUTPATIENT
Start: 2021-04-19 | End: 2021-05-17 | Stop reason: SDUPTHER

## 2021-04-30 ENCOUNTER — OFFICE VISIT (OUTPATIENT)
Dept: NEUROLOGY | Age: 26
End: 2021-04-30
Payer: COMMERCIAL

## 2021-04-30 ENCOUNTER — PATIENT MESSAGE (OUTPATIENT)
Dept: INTERNAL MEDICINE CLINIC | Age: 26
End: 2021-04-30

## 2021-04-30 DIAGNOSIS — F41.8 ANXIETY WITH SOMATIC FEATURES: ICD-10-CM

## 2021-04-30 DIAGNOSIS — Z87.820 HISTORY OF MULTIPLE CONCUSSIONS: ICD-10-CM

## 2021-04-30 DIAGNOSIS — F43.10 PTSD (POST-TRAUMATIC STRESS DISORDER): ICD-10-CM

## 2021-04-30 DIAGNOSIS — F32.1 MODERATE MAJOR DEPRESSION (HCC): ICD-10-CM

## 2021-04-30 DIAGNOSIS — J45.20 MILD INTERMITTENT ASTHMA, UNSPECIFIED WHETHER COMPLICATED: ICD-10-CM

## 2021-04-30 DIAGNOSIS — F07.81 POST CONCUSSION SYNDROME: Primary | ICD-10-CM

## 2021-04-30 DIAGNOSIS — R41.840 ATTENTION DEFICIT: ICD-10-CM

## 2021-04-30 PROCEDURE — 90832 PSYTX W PT 30 MINUTES: CPT | Performed by: CLINICAL NEUROPSYCHOLOGIST

## 2021-04-30 RX ORDER — ALBUTEROL SULFATE 90 UG/1
2 AEROSOL, METERED RESPIRATORY (INHALATION)
Qty: 1 INHALER | Refills: 5 | Status: SHIPPED | OUTPATIENT
Start: 2021-04-30

## 2021-04-30 NOTE — PROGRESS NOTES
Prior to seeing the patient I reviewed the records, including the previously completed report, the records in [place], and any updated visits from other providers since I saw the patient last.      Today, I engaged in a psychoeducational and supportive and cognitive/behavioral psychotherapy session with the patient. I provided psychotherapy in the form of psychoeducation and support with respect to the results of the recent Neuropsychological Evaluation, including discussing individual tests as well as patient's areas of neurocognitive strength versus weakness. We discussed, in detail, the following: This patient generated a predominantly normal range Neuropsychological Evaluation with respect to neurocognitive functioning. In this regard, the only impairment noted was for sustained visual attention. IQ is very superior, though the domain performances vary from normal to very superior. Learning and memory scores are excellent, and his performances across all other neurocognitive domains assessed are fully within or above the normal range. There is no evidence of a focal or lateralized brain dysfunction. From an emotional standpoint, there is moderate to severe anxiety with somatic features, PTSD, and moderate to severe depression. See personality profile above. Bipolar is not showing on examination here, but some borderline personality features including mood swings, are present. There is much symptom overlap here.                  Thankfully, this profile is not consistent with organic residua from [facility]. Instead, this individual has very superior range IQ which has previously masked underlying attention deficit type concerns now compounded by significant somatic anxiety, depression, and PTSD. I am much more concerned about his mood that I am the ADHD-inattentive concern.   In addition to continued medical care, my recommendations include psychiatric medication management for marked anxiety (and also depression-though some of the depression is functional), as well as active engagement in individual psychotherapy. Consider EMDR. Also, consider an appropriate medication for attention if this is not medically contraindicated. With an improvement in mood should come up dramatic improvement in his physical symptoms as well as day-to-day neurocognitive functioning. I'm not currently concerned about competency/capacity, driving, day-to-day supervision, etc.  With treatment, his prognosis is excellent and I hope that he finds the neurocognitive test results as reassuring as do I. We now have extensive baseline neurocognitive data on him. Follow-up as needed. Clinical correlation is, of course, indicated.                 I will discuss these findings with the patient when he follows up with me in the near future. A follow up Neuropsychological Evaluation is indicated on a prn basis, especially if there are any cognitive and/or emotional changes.       DIAGNOSES:              The Referral Diagnosis of PCS - IS NOT SUPPORTED                                      Chronic ADHD, Inattentive, Mild                                      Anxiety with Somatic Features- Moderate To Severe                                      Major Depression - Moderate to Severe                                      PTSD                                      ADHD, Inattentive, Mild, Chronic      Education was provided regarding my diagnostic impressions, and we discussed treatment plan/options. I also answered numerous questions related to the clinical findings, including discussing various methods to improve cognition and mood. Counseling provided regarding mood and cognition. CBT and supportive psychotherapy techniques were utilized. Supportive/Cognitive Behavioral/Solution Focused psychotherapy provided  Discussed rational versus irrational thinking patterns and their consequences. Discussed healthy/adaptive and unhealthy/maladaptive coping. The patient needs to follow up with [facility]. He has gone back to work,. It is going okay. He has been doing better, but having ongoing issues with light and headaches but less sensitive and doing better. Dizziness better. He has gone back to work with limitation. He has been struggling.       The patient had the following concerns which I deferred to their referring provider: med for attention      Time spent today:  20

## 2021-05-17 ENCOUNTER — OFFICE VISIT (OUTPATIENT)
Dept: NEUROLOGY | Age: 26
End: 2021-05-17
Payer: COMMERCIAL

## 2021-05-17 VITALS
HEIGHT: 70 IN | HEART RATE: 80 BPM | WEIGHT: 148 LBS | OXYGEN SATURATION: 98 % | DIASTOLIC BLOOD PRESSURE: 82 MMHG | SYSTOLIC BLOOD PRESSURE: 122 MMHG | BODY MASS INDEX: 21.19 KG/M2

## 2021-05-17 DIAGNOSIS — F32.1 MODERATE MAJOR DEPRESSION (HCC): ICD-10-CM

## 2021-05-17 DIAGNOSIS — G44.329 CHRONIC POST-TRAUMATIC HEADACHE, NOT INTRACTABLE: Primary | ICD-10-CM

## 2021-05-17 DIAGNOSIS — T88.7XXA SIDE EFFECT OF MEDICATION: ICD-10-CM

## 2021-05-17 DIAGNOSIS — Z87.820 HISTORY OF MULTIPLE CONCUSSIONS: ICD-10-CM

## 2021-05-17 PROCEDURE — 99214 OFFICE O/P EST MOD 30 MIN: CPT | Performed by: PSYCHIATRY & NEUROLOGY

## 2021-05-17 RX ORDER — AMITRIPTYLINE HYDROCHLORIDE 25 MG/1
25 TABLET, FILM COATED ORAL
Qty: 90 TAB | Refills: 2 | Status: SHIPPED | OUTPATIENT
Start: 2021-05-17 | End: 2021-12-27

## 2021-05-17 NOTE — PROGRESS NOTES
Chief Complaint   Patient presents with    Follow-up     post concussion syndrome, migraines, \" The migraines are less now\"     Visit Vitals  /82 (BP 1 Location: Left upper arm, BP Patient Position: Sitting)   Pulse 80   Ht 5' 10\" (1.778 m)   Wt 148 lb (67.1 kg)   SpO2 98%   BMI 21.24 kg/m²
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
## 2021-12-13 ENCOUNTER — OFFICE VISIT (OUTPATIENT)
Dept: INTERNAL MEDICINE CLINIC | Age: 26
End: 2021-12-13
Payer: COMMERCIAL

## 2021-12-13 VITALS
SYSTOLIC BLOOD PRESSURE: 118 MMHG | HEART RATE: 78 BPM | RESPIRATION RATE: 20 BRPM | OXYGEN SATURATION: 98 % | TEMPERATURE: 98.3 F | BODY MASS INDEX: 21.47 KG/M2 | HEIGHT: 70 IN | DIASTOLIC BLOOD PRESSURE: 78 MMHG | WEIGHT: 150 LBS

## 2021-12-13 DIAGNOSIS — F41.9 ANXIETY: ICD-10-CM

## 2021-12-13 DIAGNOSIS — F39 MOOD DISORDER (HCC): ICD-10-CM

## 2021-12-13 DIAGNOSIS — Z11.59 NEED FOR HEPATITIS C SCREENING TEST: ICD-10-CM

## 2021-12-13 DIAGNOSIS — Z86.69 HISTORY OF MIGRAINE HEADACHES: Primary | ICD-10-CM

## 2021-12-13 DIAGNOSIS — J45.20 MILD INTERMITTENT ASTHMA, UNSPECIFIED WHETHER COMPLICATED: ICD-10-CM

## 2021-12-13 DIAGNOSIS — L81.9 HYPOPIGMENTATION: ICD-10-CM

## 2021-12-13 PROCEDURE — 99214 OFFICE O/P EST MOD 30 MIN: CPT | Performed by: INTERNAL MEDICINE

## 2021-12-13 RX ORDER — ARIPIPRAZOLE 5 MG/1
5 TABLET ORAL DAILY
COMMUNITY

## 2021-12-13 NOTE — PROGRESS NOTES
Health Maintenance Due   Topic Date Due    Hepatitis C Screening  Never done    HPV Age 9Y-34Y (1 - 2-dose series) Never done    COVID-19 Vaccine (1) Never done    DTaP/Tdap/Td series (1 - Tdap) Never done    Flu Vaccine (1) 09/01/2021       Chief Complaint   Patient presents with    Establish Care    Rash       1. Have you been to the ER, urgent care clinic since your last visit? Hospitalized since your last visit? No    2. Have you seen or consulted any other health care providers outside of the [address] [place] [address] since your last visit? Include any pap smears or colon screening. No    3) Do you have an Advance Directive on file? no    4) Are you interested in receiving information on Advance Directives? NO      Patient is accompanied by self I have received verbal consent from Bartolo Child to discuss any/all medical information while they are present in the room.

## 2021-12-13 NOTE — PROGRESS NOTES
HISTORY OF PRESENT ILLNESS  Perla Castro is a 32 y.o. adult. Patient was seen to establish care at this location. PMH of a mood disorder, anxiety, HA. Patient reports that he seen by [facility] psych every 3 months. Was recently placed on Abilify earlier this year. Continues to be on Lamictal, amitriptyline and Ambien. States that this combinations I working for him currently. Also reports that he has been having a rash to his upper back on and off. States that it will itch and turn red. Some on the area has white spots as well. Has use antifungal spray and thay does help with the irritation. His father has something similar. Is on amitriptyline for her HA. This does help. Has several concussions as a child. Is going to get the COVID booster and flu soon  Visit Vitals  /78 (BP 1 Location: Right arm, BP Patient Position: Sitting, BP Cuff Size: Adult)   Pulse 78   Temp 98.3 °F (36.8 °C) (Oral)   Resp 20   Ht 5' 10\" (1.778 m)   Wt 150 lb (68 kg)   SpO2 98%   BMI 21.52 kg/m²     Past Medical History:   Diagnosis Date    Asthma     Borderline personality disorder ([address]     Concussion     Migraines      Past Surgical History:   Procedure Laterality Date    HX ORTHOPAEDIC      right shoulder and both hips     Family History   Problem Relation Age of Onset    Asthma Mother     Asthma Father      Outpatient Encounter Medications as of 12/13/2021   Medication Sig Dispense Refill    ARIPiprazole (Abilify) 5 mg tablet Take 5 mg by mouth daily.  amitriptyline (ELAVIL) 25 mg tablet Take 1 Tab by mouth nightly. 90 Tab 2    albuterol (PROVENTIL HFA, VENTOLIN HFA, PROAIR HFA) 90 mcg/actuation inhaler Take 2 Puffs by inhalation every six (6) hours as needed for Wheezing. 1 Inhaler 5    zolpidem (Ambien) 5 mg tablet Take  by mouth nightly as needed for Sleep.  lamoTRIgine (LAMICTAL) 200 mg tablet Take  by mouth daily.       [DISCONTINUED] busPIRone (BUSPAR) 15 mg tablet Take 15 mg by mouth three (3) times daily as needed. (Patient not taking: Reported on 12/13/2021)       No facility-administered encounter medications on file as of 12/13/2021. HPI    Review of Systems   Constitutional: Negative. Respiratory: Negative. Cardiovascular: Negative. Gastrointestinal: Negative. Genitourinary: Negative. Musculoskeletal: Negative. Skin: Positive for itching and rash. Neurological: Positive for headaches. Psychiatric/Behavioral: Negative. Physical Exam  Vitals and nursing note reviewed. Cardiovascular:      Rate and Rhythm: Normal rate and regular rhythm. Pulmonary:      Effort: Pulmonary effort is normal.      Breath sounds: Normal breath sounds. Abdominal:      Palpations: Abdomen is soft. Musculoskeletal:         General: Normal range of motion. Skin:     General: Skin is warm. Neurological:      Mental Status: Jose Alejandro Joiner is alert and oriented to person, place, and time. Psychiatric:         Behavior: Behavior normal.         ASSESSMENT and PLAN  Diagnoses and all orders for this visit:    1. History of migraine headaches  -     CBC WITH AUTOMATED DIFF; Future  -     METABOLIC PANEL, COMPREHENSIVE; Future    2. Mood disorder ([facility] [address]  -     TSH 3RD GENERATION; Future    3. Anxiety        -     Continue to see [facility] psych as scheduled every 3 months   4. Mild intermittent asthma, unspecified whether complicated    5. Hypopigmentation  -     REFERRAL TO DERMATOLOGY    6. Need for hepatitis C screening test  -     HEPATITIS C AB;  Future      Follow-up and Dispositions    · Return in about 1 year (around 12/13/2022), or if symptoms worsen or fail to improve, for Follow up.       lab results and schedule of future lab studies reviewed with patient  reviewed diet, exercise and weight control  reviewed medications and side effects in detail

## 2021-12-14 LAB
ALBUMIN SERPL-MCNC: 4.8 G/DL (ref 4.1–5.2)
ALBUMIN/GLOB SERPL: 1.8 {RATIO} (ref 1.2–2.2)
ALP SERPL-CCNC: 88 IU/L (ref 44–121)
ALT SERPL-CCNC: 20 IU/L (ref 0–44)
AST SERPL-CCNC: 21 IU/L (ref 0–40)
BASOPHILS # BLD AUTO: 0 X10E3/UL (ref 0–0.2)
BASOPHILS NFR BLD AUTO: 1 %
BILIRUB SERPL-MCNC: 0.6 MG/DL (ref 0–1.2)
BUN SERPL-MCNC: 10 MG/DL (ref 6–20)
BUN/CREAT SERPL: 12 (ref 9–20)
CALCIUM SERPL-MCNC: 9.9 MG/DL (ref 8.7–10.2)
CHLORIDE SERPL-SCNC: 102 MMOL/L (ref 96–106)
CO2 SERPL-SCNC: 27 MMOL/L (ref 20–29)
CREAT SERPL-MCNC: 0.84 MG/DL (ref 0.76–1.27)
EOSINOPHIL # BLD AUTO: 0.1 X10E3/UL (ref 0–0.4)
EOSINOPHIL NFR BLD AUTO: 2 %
ERYTHROCYTE [DISTWIDTH] IN BLOOD BY AUTOMATED COUNT: 12.2 % (ref 11.6–15.4)
GLOBULIN SER CALC-MCNC: 2.7 G/DL (ref 1.5–4.5)
GLUCOSE SERPL-MCNC: 95 MG/DL (ref 65–99)
HCT VFR BLD AUTO: 45.5 % (ref 37.5–51)
HCV AB S/CO SERPL IA: <0.1 S/CO RATIO (ref 0–0.9)
HGB BLD-MCNC: 16 G/DL (ref 13–17.7)
IMM GRANULOCYTES # BLD AUTO: 0 X10E3/UL (ref 0–0.1)
IMM GRANULOCYTES NFR BLD AUTO: 0 %
LYMPHOCYTES # BLD AUTO: 1.6 X10E3/UL (ref 0.7–3.1)
LYMPHOCYTES NFR BLD AUTO: 36 %
MCH RBC QN AUTO: 31.5 PG (ref 26.6–33)
MCHC RBC AUTO-ENTMCNC: 35.2 G/DL (ref 31.5–35.7)
MCV RBC AUTO: 90 FL (ref 79–97)
MONOCYTES # BLD AUTO: 0.4 X10E3/UL (ref 0.1–0.9)
MONOCYTES NFR BLD AUTO: 8 %
NEUTROPHILS # BLD AUTO: 2.5 X10E3/UL (ref 1.4–7)
NEUTROPHILS NFR BLD AUTO: 53 %
PLATELET # BLD AUTO: 233 X10E3/UL (ref 150–450)
POTASSIUM SERPL-SCNC: 4.7 MMOL/L (ref 3.5–5.2)
PROT SERPL-MCNC: 7.5 G/DL (ref 6–8.5)
RBC # BLD AUTO: 5.08 X10E6/UL (ref 4.14–5.8)
SODIUM SERPL-SCNC: 142 MMOL/L (ref 134–144)
TSH SERPL DL<=0.005 MIU/L-ACNC: 2.05 UIU/ML (ref 0.45–4.5)
WBC # BLD AUTO: 4.6 X10E3/UL (ref 3.4–10.8)

## 2021-12-27 RX ORDER — AMITRIPTYLINE HYDROCHLORIDE 25 MG/1
TABLET, FILM COATED ORAL
Qty: 90 TABLET | Refills: 2 | Status: SHIPPED | OUTPATIENT
Start: 2021-12-27 | End: 2022-06-03 | Stop reason: SDUPTHER

## 2022-06-03 ENCOUNTER — OFFICE VISIT (OUTPATIENT)
Dept: NEUROLOGY | Age: 27
End: 2022-06-03
Payer: MEDICARE

## 2022-06-03 VITALS
SYSTOLIC BLOOD PRESSURE: 120 MMHG | DIASTOLIC BLOOD PRESSURE: 70 MMHG | HEART RATE: 74 BPM | RESPIRATION RATE: 18 BRPM | OXYGEN SATURATION: 98 %

## 2022-06-03 DIAGNOSIS — G44.329 CHRONIC POST-TRAUMATIC HEADACHE, NOT INTRACTABLE: ICD-10-CM

## 2022-06-03 DIAGNOSIS — Z87.820 HISTORY OF MULTIPLE CONCUSSIONS: ICD-10-CM

## 2022-06-03 DIAGNOSIS — G43.709 CHRONIC MIGRAINE W/O AURA, NOT INTRACTABLE, W/O STAT MIGR: Primary | ICD-10-CM

## 2022-06-03 PROCEDURE — G8420 CALC BMI NORM PARAMETERS: HCPCS | Performed by: PSYCHIATRY & NEUROLOGY

## 2022-06-03 PROCEDURE — G8427 DOCREV CUR MEDS BY ELIG CLIN: HCPCS | Performed by: PSYCHIATRY & NEUROLOGY

## 2022-06-03 PROCEDURE — G9717 DOC PT DX DEP/BP F/U NT REQ: HCPCS | Performed by: PSYCHIATRY & NEUROLOGY

## 2022-06-03 PROCEDURE — 99213 OFFICE O/P EST LOW 20 MIN: CPT | Performed by: PSYCHIATRY & NEUROLOGY

## 2022-06-03 RX ORDER — AMITRIPTYLINE HYDROCHLORIDE 25 MG/1
25 TABLET, FILM COATED ORAL
Qty: 90 TABLET | Refills: 3 | Status: SHIPPED | OUTPATIENT
Start: 2022-06-03
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
## 2022-08-04 ENCOUNTER — OFFICE VISIT (OUTPATIENT)
Dept: INTERNAL MEDICINE CLINIC | Age: 27
End: 2022-08-04
Payer: MEDICAID

## 2022-08-04 VITALS
WEIGHT: 157.6 LBS | TEMPERATURE: 98 F | SYSTOLIC BLOOD PRESSURE: 120 MMHG | HEIGHT: 70 IN | BODY MASS INDEX: 22.56 KG/M2 | RESPIRATION RATE: 16 BRPM | OXYGEN SATURATION: 99 % | DIASTOLIC BLOOD PRESSURE: 78 MMHG | HEART RATE: 88 BPM

## 2022-08-04 DIAGNOSIS — T14.8XXA MUSCLE STRAIN: Primary | ICD-10-CM

## 2022-08-04 PROCEDURE — 99213 OFFICE O/P EST LOW 20 MIN: CPT | Performed by: INTERNAL MEDICINE

## 2022-08-04 RX ORDER — CYCLOBENZAPRINE HCL 10 MG
10 TABLET ORAL
Qty: 30 TABLET | Refills: 0 | Status: SHIPPED | OUTPATIENT
Start: 2022-08-04

## 2022-08-04 NOTE — PROGRESS NOTES
Nursing staff confirmed patient with full name and . Prepared patient for visit today by obtaining vitals, verifying medication list and allergies, and briefly discussing reason for visit. Chief Complaint   Patient presents with    Anxiety    Bipolar       Current Outpatient Medications   Medication Sig    amitriptyline (ELAVIL) 25 mg tablet Take 1 Tablet by mouth nightly. ARIPiprazole (Abilify) 5 mg tablet Take 5 mg by mouth daily. albuterol (PROVENTIL HFA, VENTOLIN HFA, PROAIR HFA) 90 mcg/actuation inhaler Take 2 Puffs by inhalation every six (6) hours as needed for Wheezing. zolpidem (Ambien) 5 mg tablet Take  by mouth nightly as needed for Sleep.    lamoTRIgine (LAMICTAL) 200 mg tablet Take 50 mg by mouth daily. No current facility-administered medications for this visit. 1. \"Have you been to the ER, urgent care clinic since your last visit? Hospitalized since your last visit? \" No    2. \"Have you seen or consulted any other health care providers outside of the [address] [place] [address] since your last visit? \" No     3. For patients aged 39-70: Has the patient had a colonoscopy / FIT/ Cologuard? NA - based on age      If the patient is female:    4. For patients aged 41-77: Has the patient had a mammogram within the past 2 years? NA - based on age or sex      11. For patients aged 21-65: Has the patient had a pap smear?  No

## 2022-08-04 NOTE — PROGRESS NOTES
HISTORY OF PRESENT ILLNESS  Bradley Underwood is a 32 y.o. adult. Patient was seen after he began to have soreness, tightness and mild tenderness to her upper back and bicep of his right side. Does do a lot of manual work. Reports no recent injury. No weakness and the discomfort does not radiate. Has been taking OTC to help. No actual shoulder pain. Visit Vitals  /78 (BP 1 Location: Left upper arm, BP Patient Position: Sitting, BP Cuff Size: Adult)   Pulse 88   Temp 98 °F (36.7 °C) (Oral)   Resp 16   Ht 5' 10\" (1.778 m)   Wt 157 lb 9.6 oz (71.5 kg)   SpO2 99%   BMI 22.61 kg/m²     Past Medical History:   Diagnosis Date    Asthma     Borderline personality disorder ([facility] [address]     Concussion     Migraines      Past Surgical History:   Procedure Laterality Date    HX ORTHOPAEDIC      right shoulder and both hips     Family History   Problem Relation Age of Onset    Asthma Mother     Asthma Father      Outpatient Encounter Medications as of 8/4/2022   Medication Sig Dispense Refill    cyclobenzaprine (FLEXERIL) 10 mg tablet Take 1 Tablet by mouth three (3) times daily as needed for Muscle Spasm(s). 30 Tablet 0    amitriptyline (ELAVIL) 25 mg tablet Take 1 Tablet by mouth nightly. 90 Tablet 3    ARIPiprazole (Abilify) 5 mg tablet Take 5 mg by mouth daily. albuterol (PROVENTIL HFA, VENTOLIN HFA, PROAIR HFA) 90 mcg/actuation inhaler Take 2 Puffs by inhalation every six (6) hours as needed for Wheezing. 1 Inhaler 5    zolpidem (Ambien) 5 mg tablet Take  by mouth nightly as needed for Sleep.      lamoTRIgine (LAMICTAL) 200 mg tablet Take 50 mg by mouth daily. No facility-administered encounter medications on file as of 8/4/2022. HPI    Review of Systems   Constitutional: Negative. Respiratory: Negative. Cardiovascular: Negative. Gastrointestinal: Negative. Musculoskeletal:  Positive for back pain. Neurological: Negative. Physical Exam  Vitals and nursing note reviewed.    Cardiovascular: Rate and Rhythm: Normal rate and regular rhythm. Pulmonary:      Effort: Pulmonary effort is normal.      Breath sounds: Normal breath sounds. Musculoskeletal:      Right upper arm: Tenderness present. No swelling. Left upper arm: Normal.        Arms:       Lumbar back: Normal.      Comments: Describes the area as tight and sore    Skin:     General: Skin is warm. Neurological:      Mental Status: Margarita Tracy is alert and oriented to person, place, and time. ASSESSMENT and PLAN  Diagnoses and all orders for this visit:    1. Muscle strain  -     cyclobenzaprine (FLEXERIL) 10 mg tablet; Take 1 Tablet by mouth three (3) times daily as needed for Muscle Spasm(s). -     encouraged ibuprofen 800 mg every 6 hours, heat as needed. Range of motion exercise. Follow-up and Dispositions    Return if symptoms worsen or fail to improve.        reviewed diet, exercise and weight control  reviewed medications and side effects in detail